# Patient Record
Sex: FEMALE | Race: WHITE | NOT HISPANIC OR LATINO | Employment: UNEMPLOYED | URBAN - NONMETROPOLITAN AREA
[De-identification: names, ages, dates, MRNs, and addresses within clinical notes are randomized per-mention and may not be internally consistent; named-entity substitution may affect disease eponyms.]

---

## 2021-01-19 PROCEDURE — U0004 COV-19 TEST NON-CDC HGH THRU: HCPCS | Performed by: PHYSICIAN ASSISTANT

## 2021-01-22 ENCOUNTER — INITIAL PRENATAL (OUTPATIENT)
Dept: OBSTETRICS AND GYNECOLOGY | Facility: CLINIC | Age: 21
End: 2021-01-22

## 2021-01-22 VITALS — WEIGHT: 168 LBS | DIASTOLIC BLOOD PRESSURE: 72 MMHG | SYSTOLIC BLOOD PRESSURE: 122 MMHG | BODY MASS INDEX: 26.71 KG/M2

## 2021-01-22 DIAGNOSIS — Z34.92 NORMAL PREGNANCY, SECOND TRIMESTER: ICD-10-CM

## 2021-01-22 DIAGNOSIS — Z3A.22 22 WEEKS GESTATION OF PREGNANCY: ICD-10-CM

## 2021-01-22 DIAGNOSIS — Z36.89 SCREENING, ANTENATAL, FOR FETAL ANATOMIC SURVEY: Primary | ICD-10-CM

## 2021-01-22 PROCEDURE — 99204 OFFICE O/P NEW MOD 45 MIN: CPT | Performed by: NURSE PRACTITIONER

## 2021-01-22 RX ORDER — ONDANSETRON 4 MG/1
4 TABLET, FILM COATED ORAL EVERY 8 HOURS PRN
COMMUNITY

## 2021-01-22 RX ORDER — CALCIUM CARBONATE 300MG(750)
1 TABLET,CHEWABLE ORAL DAILY
Qty: 30 TABLET | Refills: 4 | Status: SHIPPED | OUTPATIENT
Start: 2021-01-22

## 2021-01-22 NOTE — PATIENT INSTRUCTIONS
Second Trimester of Pregnancy  The second trimester is from week 13 through week 28, months 4 through 6. The second trimester is often a time when you feel your best. Your body has also adjusted to being pregnant, and you begin to feel better physically. Usually, morning sickness has lessened or quit completely, you may have more energy, and you may have an increase in appetite. The second trimester is also a time when the fetus is growing rapidly. At the end of the sixth month, the fetus is about 9 inches long and weighs about 1½ pounds. You will likely begin to feel the baby move (quickening) between 18 and 20 weeks of the pregnancy.  BODY CHANGES  Your body goes through many changes during pregnancy. The changes vary from woman to woman.   · Your weight will continue to increase. You will notice your lower abdomen bulging out.  · You may begin to get stretch marks on your hips, abdomen, and breasts.  · You may develop headaches that can be relieved by medicines approved by your health care provider.  · You may urinate more often because the fetus is pressing on your bladder.  · You may develop or continue to have heartburn as a result of your pregnancy.  · You may develop constipation because certain hormones are causing the muscles that push waste through your intestines to slow down.  · You may develop hemorrhoids or swollen, bulging veins (varicose veins).  · You may have back pain because of the weight gain and pregnancy hormones relaxing your joints between the bones in your pelvis and as a result of a shift in weight and the muscles that support your balance.  · Your breasts will continue to grow and be tender.  · Your gums may bleed and may be sensitive to brushing and flossing.  · Dark spots or blotches (chloasma, mask of pregnancy) may develop on your face. This will likely fade after the baby is born.  · A dark line from your belly button to the pubic area (linea nigra) may appear. This will likely fade  after the baby is born.  · You may have changes in your hair. These can include thickening of your hair, rapid growth, and changes in texture. Some women also have hair loss during or after pregnancy, or hair that feels dry or thin. Your hair will most likely return to normal after your baby is born.  WHAT TO EXPECT AT YOUR PRENATAL VISITS  During a routine prenatal visit:  · You will be weighed to make sure you and the fetus are growing normally.  · Your blood pressure will be taken.  · Your abdomen will be measured to track your baby's growth.  · The fetal heartbeat will be listened to.  · Any test results from the previous visit will be discussed.  Your health care provider may ask you:  · How you are feeling.  · If you are feeling the baby move.  · If you have had any abnormal symptoms, such as leaking fluid, bleeding, severe headaches, or abdominal cramping.  · If you are using any tobacco products, including cigarettes, chewing tobacco, and electronic cigarettes.  · If you have any questions.  Other tests that may be performed during your second trimester include:  · Blood tests that check for:  ¨ Low iron levels (anemia).  ¨ Gestational diabetes (between 24 and 28 weeks).  ¨ Rh antibodies.  · Urine tests to check for infections, diabetes, or protein in the urine.  · An ultrasound to confirm the proper growth and development of the baby.  · An amniocentesis to check for possible genetic problems.  · Fetal screens for spina bifida and Down syndrome.  · HIV (human immunodeficiency virus) testing. Routine prenatal testing includes screening for HIV, unless you choose not to have this test.  HOME CARE INSTRUCTIONS   · Avoid all smoking, herbs, alcohol, and unprescribed drugs. These chemicals affect the formation and growth of the baby.  · Do not use any tobacco products, including cigarettes, chewing tobacco, and electronic cigarettes. If you need help quitting, ask your health care provider. You may receive  counseling support and other resources to help you quit.  · Follow your health care provider's instructions regarding medicine use. There are medicines that are either safe or unsafe to take during pregnancy.  · Exercise only as directed by your health care provider. Experiencing uterine cramps is a good sign to stop exercising.  · Continue to eat regular, healthy meals.  · Wear a good support bra for breast tenderness.  · Do not use hot tubs, steam rooms, or saunas.  · Wear your seat belt at all times when driving.  · Avoid raw meat, uncooked cheese, cat litter boxes, and soil used by cats. These carry germs that can cause birth defects in the baby.  · Take your prenatal vitamins.  · Take 9203-6435 mg of calcium daily starting at the 20th week of pregnancy until you deliver your baby.  · Try taking a stool softener (if your health care provider approves) if you develop constipation. Eat more high-fiber foods, such as fresh vegetables or fruit and whole grains. Drink plenty of fluids to keep your urine clear or pale yellow.  · Take warm sitz baths to soothe any pain or discomfort caused by hemorrhoids. Use hemorrhoid cream if your health care provider approves.  · If you develop varicose veins, wear support hose. Elevate your feet for 15 minutes, 3-4 times a day. Limit salt in your diet.  · Avoid heavy lifting, wear low heel shoes, and practice good posture.  · Rest with your legs elevated if you have leg cramps or low back pain.  · Visit your dentist if you have not gone yet during your pregnancy. Use a soft toothbrush to brush your teeth and be gentle when you floss.  · A sexual relationship may be continued unless your health care provider directs you otherwise.  · Continue to go to all your prenatal visits as directed by your health care provider.  SEEK MEDICAL CARE IF:   · You have dizziness.  · You have mild pelvic cramps, pelvic pressure, or nagging pain in the abdominal area.  · You have persistent nausea,  vomiting, or diarrhea.  · You have a bad smelling vaginal discharge.  · You have pain with urination.  SEEK IMMEDIATE MEDICAL CARE IF:   · You have a fever.  · You are leaking fluid from your vagina.  · You have spotting or bleeding from your vagina.  · You have severe abdominal cramping or pain.  · You have rapid weight gain or loss.  · You have shortness of breath with chest pain.  · You notice sudden or extreme swelling of your face, hands, ankles, feet, or legs.  · You have not felt your baby move in over an hour.  · You have severe headaches that do not go away with medicine.  · You have vision changes.     This information is not intended to replace advice given to you by your health care provider. Make sure you discuss any questions you have with your health care provider.     Stable

## 2021-01-22 NOTE — PROGRESS NOTES
Chief Complaint   Patient presents with   • Initial Prenatal Visit     NOB, LMP 2020, Last pap  WNL, BRENNEN 2021 per: [facility], Neli English. Does not have all her records with her today. Patient states she is doing well      22 4/7 wks     HPI  , EDC 2021 presents to our office today for initial prenatal visit.    She has received PNC in [place] - she reports a total of 3 visits.    Came to [place] with FOB   She is not working at present   She did smoke cigarettes but has stopped with pregnancy - she denies alcohol or recreational drug use     EDC from initial u/s at ?? 11 wks   No labs -   Reports she had pap smear with initial OB visit in [place]  She reports she was not given all her records     Chart Review:  1 visit at 11 wks - - noted IUP with partial previa   No labs       Past Medical History:   Diagnosis Date   • Asthma    • Asthma         Current Outpatient Medications:   •  ondansetron (ZOFRAN) 4 MG tablet, Take 4 mg by mouth Every 8 (Eight) Hours As Needed for Nausea or Vomiting., Disp: , Rfl:   •  prenatal vitamin (prenatal, CLASSIC, vitamin) tablet, Take  by mouth Daily., Disp: , Rfl:    No Known Allergies   History reviewed. No pertinent surgical history.    Social History     Socioeconomic History   • Marital status: Single     Spouse name: Not on file   • Number of children: Not on file   • Years of education: Not on file   • Highest education level: Not on file   Social Needs   • Financial resource strain: Not hard at all   • Food insecurity     Worry: Never true     Inability: Never true   • Transportation needs     Medical: Yes     Non-medical: Yes   Tobacco Use   • Smoking status: Former Smoker   • Smokeless tobacco: Never Used   Substance and Sexual Activity   • Alcohol use: Never     Frequency: Never   • Drug use: Not Currently   • Sexual activity: Yes     Partners: Male   Lifestyle   • Physical activity     Days per week: Patient refused     Minutes  per session: Patient refused   • Stress: Only a little      Family History   Problem Relation Age of Onset   • Diabetes Maternal Grandmother    • Colon cancer Maternal Grandfather    • Lung cancer Maternal Grandfather    • Heart disease Maternal Grandfather    • Stroke Maternal Grandfather    • Stroke Paternal Grandfather        The following portions of the patient's history were reviewed as note above and updated   current medications allergies, past family history, past medical history, past social history and past surgical history.      ROS  Pertinent items are noted in HPI, all other systems reviewed and negative      Physical Exam  /72   Wt 76.2 kg (168 lb)   BMI 26.71 kg/m²        Psych: Alert and oriented to time, place and person  Mood and affect appropriate   General: well developed; well nourished  no acute distress  Head: normocephalic  Neck: The neck is supple and the trachea is midline  Musculoskeletal: Normal gait  Full range of motion  Lungs:  breathing is unlabored  Back: Negative CVAT  Abdomen: Gravid - soft and non-tender + FHTs   Lower Extremities: Neg edema and moves extremities well  Genitourinary: deferred      MDM  Impression:  Problems/Risks: Normal Pregnancy  transfer of care   Limited PN Care    Tests done today: Routine NOB labs / U/A Culture - needs to be drawn next wk   U/S rev'd - 27 % growth  Boy     Topics discussed: Rout NOB education including nutrition, exercise, OTCmeds, genetic screening   flu vac  Written info on 2nd trimester of pregnancy   Request records from previous practioner  encouraged questions - call prn    Tests next visit: 1 hr glucola

## 2021-01-25 PROCEDURE — 90471 IMMUNIZATION ADMIN: CPT | Performed by: NURSE PRACTITIONER

## 2021-01-25 PROCEDURE — 90686 IIV4 VACC NO PRSV 0.5 ML IM: CPT | Performed by: NURSE PRACTITIONER

## 2021-02-22 ENCOUNTER — ROUTINE PRENATAL (OUTPATIENT)
Dept: OBSTETRICS AND GYNECOLOGY | Facility: CLINIC | Age: 21
End: 2021-02-22

## 2021-02-22 VITALS — SYSTOLIC BLOOD PRESSURE: 112 MMHG | WEIGHT: 172.2 LBS | BODY MASS INDEX: 27.38 KG/M2 | DIASTOLIC BLOOD PRESSURE: 64 MMHG

## 2021-02-22 DIAGNOSIS — Z3A.26 26 WEEKS GESTATION OF PREGNANCY: Primary | ICD-10-CM

## 2021-02-22 PROCEDURE — 99212 OFFICE O/P EST SF 10 MIN: CPT | Performed by: OBSTETRICS & GYNECOLOGY

## 2021-02-22 RX ORDER — LORATADINE 10 MG/1
10 TABLET ORAL DAILY
COMMUNITY
Start: 2021-02-20

## 2021-02-22 RX ORDER — FLUTICASONE PROPIONATE 50 MCG
2 SPRAY, SUSPENSION (ML) NASAL DAILY
COMMUNITY
Start: 2021-02-20

## 2021-02-22 NOTE — PROGRESS NOTES
Chief Complaint   Patient presents with   • Routine Prenatal Visit     Patient is here for routing prenatal visit and glucola screen.        HPI:   , 26w6d gestation reports doing well    ROS:  See Prenatal Episode/Flowsheet  /64   Wt 78.1 kg (172 lb 3.2 oz)   BMI 27.38 kg/m²      EXAM:  EXTREMITIES:  No swelling-See Prenatal Episode/Flowsheet    ABDOMEN:  FHTs/Movement noted-See Prenatal Episode/Flowsheet    URINE GLUCOSE/PROTEIN:  See Prenatal Episode/Flowsheet    PELVIC EXAM:  See Prenatal Episode/Flowsheet  CV:  Lungs:  GYN:    MDM:    No results found for: HGB, RUBELLAIGGIN, RUBELLAABIGG, HEPBSAG, LABRPR, ABORH, ABO, RH, ABSCRN, LABANTI, ABID, [id number], [id number], HEPCVIRUSABY, MDC9IUZH, STREPGPB, URINECX    U/S:    1. IUP 26w6d  2. Routine care   3. Gluocla today with PNL's

## 2021-02-23 LAB
BASOPHILS # BLD AUTO: 0.04 10*3/MM3 (ref 0–0.2)
BASOPHILS NFR BLD AUTO: 0.4 % (ref 0–1.5)
EOSINOPHIL # BLD AUTO: 0.04 10*3/MM3 (ref 0–0.4)
EOSINOPHIL NFR BLD AUTO: 0.4 % (ref 0.3–6.2)
ERYTHROCYTE [DISTWIDTH] IN BLOOD BY AUTOMATED COUNT: 12.5 % (ref 12.3–15.4)
GLUCOSE 1H P 50 G GLC PO SERPL-MCNC: 106 MG/DL (ref 65–139)
HCT VFR BLD AUTO: 34.3 % (ref 34–46.6)
HGB BLD-MCNC: 11.2 G/DL (ref 12–15.9)
IMM GRANULOCYTES # BLD AUTO: 0.08 10*3/MM3 (ref 0–0.05)
IMM GRANULOCYTES NFR BLD AUTO: 0.9 % (ref 0–0.5)
LYMPHOCYTES # BLD AUTO: 1.83 10*3/MM3 (ref 0.7–3.1)
LYMPHOCYTES NFR BLD AUTO: 19.4 % (ref 19.6–45.3)
MCH RBC QN AUTO: 32.3 PG (ref 26.6–33)
MCHC RBC AUTO-ENTMCNC: 32.7 G/DL (ref 31.5–35.7)
MCV RBC AUTO: 98.8 FL (ref 79–97)
MONOCYTES # BLD AUTO: 0.72 10*3/MM3 (ref 0.1–0.9)
MONOCYTES NFR BLD AUTO: 7.7 % (ref 5–12)
NEUTROPHILS # BLD AUTO: 6.7 10*3/MM3 (ref 1.7–7)
NEUTROPHILS NFR BLD AUTO: 71.2 % (ref 42.7–76)
NRBC BLD AUTO-RTO: 0 /100 WBC (ref 0–0.2)
PLATELET # BLD AUTO: 283 10*3/MM3 (ref 140–450)
RBC # BLD AUTO: 3.47 10*6/MM3 (ref 3.77–5.28)
WBC # BLD AUTO: 9.41 10*3/MM3 (ref 3.4–10.8)

## 2021-03-30 ENCOUNTER — ROUTINE PRENATAL (OUTPATIENT)
Dept: OBSTETRICS AND GYNECOLOGY | Facility: CLINIC | Age: 21
End: 2021-03-30

## 2021-03-30 VITALS — DIASTOLIC BLOOD PRESSURE: 74 MMHG | SYSTOLIC BLOOD PRESSURE: 118 MMHG | BODY MASS INDEX: 28.3 KG/M2 | WEIGHT: 178 LBS

## 2021-03-30 DIAGNOSIS — Z3A.32 32 WEEKS GESTATION OF PREGNANCY: ICD-10-CM

## 2021-03-30 DIAGNOSIS — Z36.89 ENCOUNTER FOR ULTRASOUND TO CHECK FETAL GROWTH: Primary | ICD-10-CM

## 2021-03-30 PROCEDURE — 99213 OFFICE O/P EST LOW 20 MIN: CPT | Performed by: OBSTETRICS & GYNECOLOGY

## 2021-03-30 NOTE — PROGRESS NOTES
Chief Complaint   Patient presents with   • Routine Prenatal Visit     Growth SCan, No Complaints/concerns , Good fetal movement        HPI:   , 32w0d gestation reports doing well    ROS:  See Prenatal Episode/Flowsheet  /74   Wt 80.7 kg (178 lb)   BMI 28.30 kg/m²      EXAM:  EXTREMITIES:  No swelling-See Prenatal Episode/Flowsheet    ABDOMEN:  FHTs/Movement noted-See Prenatal Episode/Flowsheet    URINE GLUCOSE/PROTEIN:  See Prenatal Episode/Flowsheet    PELVIC EXAM:  See Prenatal Episode/Flowsheet  CV:  Lungs:  GYN:    MDM:    Lab Results   Component Value Date    HGB 11.2 (L) 2021       U/S: Overall growth 6.1 percentile.  MARY ELLEN 13.51.  Systolic to diastolic ratio 3.35.  Vertex.  Anterior placenta.  Active fetus    1. IUP 32w0d  2. Routine care   3.  IUGR: Twice-weekly testing.  Prenatal labs needed today  Records reviewed again from [place] prenatal care.  She is to ultrasound the first trimester that were consistent though about a week ahead of her LMP derived due date.  Her LMP derived due date was 21 while the ultrasounds were 5-26.  Her 22-week ultrasound here was consistent with this 526 due date.

## 2021-03-31 LAB
ABO GROUP BLD: ABNORMAL
BASOPHILS # BLD AUTO: 0 X10E3/UL (ref 0–0.2)
BASOPHILS NFR BLD AUTO: 0 %
BLD GP AB SCN SERPL QL: NEGATIVE
EOSINOPHIL # BLD AUTO: 0.1 X10E3/UL (ref 0–0.4)
EOSINOPHIL NFR BLD AUTO: 1 %
ERYTHROCYTE [DISTWIDTH] IN BLOOD BY AUTOMATED COUNT: 12.3 % (ref 11.7–15.4)
HBV SURFACE AG SERPL QL IA: NEGATIVE
HCT VFR BLD AUTO: 34.5 % (ref 34–46.6)
HCV AB S/CO SERPL IA: <0.1 S/CO RATIO (ref 0–0.9)
HGB BLD-MCNC: 11.1 G/DL (ref 11.1–15.9)
HIV 1+2 AB+HIV1 P24 AG SERPL QL IA: NON REACTIVE
IMM GRANULOCYTES # BLD AUTO: 0 X10E3/UL (ref 0–0.1)
IMM GRANULOCYTES NFR BLD AUTO: 0 %
LYMPHOCYTES # BLD AUTO: 2.1 X10E3/UL (ref 0.7–3.1)
LYMPHOCYTES NFR BLD AUTO: 22 %
MCH RBC QN AUTO: 30.4 PG (ref 26.6–33)
MCHC RBC AUTO-ENTMCNC: 32.2 G/DL (ref 31.5–35.7)
MCV RBC AUTO: 95 FL (ref 79–97)
MONOCYTES # BLD AUTO: 0.6 X10E3/UL (ref 0.1–0.9)
MONOCYTES NFR BLD AUTO: 7 %
NEUTROPHILS # BLD AUTO: 6.6 X10E3/UL (ref 1.4–7)
NEUTROPHILS NFR BLD AUTO: 70 %
PLATELET # BLD AUTO: 272 X10E3/UL (ref 150–450)
RBC # BLD AUTO: 3.65 X10E6/UL (ref 3.77–5.28)
RH BLD: POSITIVE
RPR SER QL: NON REACTIVE
RUBV IGG SERPL IA-ACNC: 1.04 INDEX
WBC # BLD AUTO: 9.5 X10E3/UL (ref 3.4–10.8)

## 2021-04-03 ENCOUNTER — HOSPITAL ENCOUNTER (OUTPATIENT)
Facility: HOSPITAL | Age: 21
Discharge: HOME | End: 2021-04-03
Attending: NURSE PRACTITIONER | Admitting: NURSE PRACTITIONER

## 2021-04-03 VITALS
SYSTOLIC BLOOD PRESSURE: 106 MMHG | OXYGEN SATURATION: 100 % | DIASTOLIC BLOOD PRESSURE: 60 MMHG | RESPIRATION RATE: 14 BRPM | BODY MASS INDEX: 27.97 KG/M2 | TEMPERATURE: 97.8 F | HEIGHT: 66 IN | HEART RATE: 85 BPM | WEIGHT: 174 LBS

## 2021-04-03 LAB
BILIRUB BLD-MCNC: NEGATIVE MG/DL
CLARITY, POC: CLEAR
COLOR UR: YELLOW
GLUCOSE UR STRIP-MCNC: NEGATIVE MG/DL
KETONES UR QL: NEGATIVE
LEUKOCYTE EST, POC: NEGATIVE
NITRITE UR-MCNC: NEGATIVE MG/ML
PH UR: 7 [PH] (ref 5–8)
PROT UR STRIP-MCNC: NEGATIVE MG/DL
RBC # UR STRIP: NEGATIVE /UL
SP GR UR: 1.01 (ref 1–1.03)
UROBILINOGEN UR QL: NORMAL

## 2021-04-03 PROCEDURE — 81002 URINALYSIS NONAUTO W/O SCOPE: CPT | Performed by: NURSE PRACTITIONER

## 2021-04-03 PROCEDURE — G0463 HOSPITAL OUTPT CLINIC VISIT: HCPCS

## 2021-04-03 PROCEDURE — 59025 FETAL NON-STRESS TEST: CPT

## 2021-04-03 PROCEDURE — 59025 FETAL NON-STRESS TEST: CPT | Performed by: NURSE PRACTITIONER

## 2021-04-03 NOTE — NON STRESS TEST
Triage Note - Nursing Documentation  Labor and Delivery Admission Log    Sharonda Saez  : 2000  MRN: [id number]  CSN: [id number]    Date in / Time in:  4/3/2021  Time in: 836    Date out / Time out:    Time out: 935    Nurse: Tracy Kern RN    Patient Info: She is a 20 y.o. year old  at 32w4d with an BRENNEN of 2021, by Ultrasound who was seen on the [facility].    Chief Complaint:   Chief Complaint   Patient presents with   • Non-stress Test     SMALL FOR DATES       Provider Instructions / Disposition: Reactive NST, Active Fetus, VSS, PO Hydrated, discharged home    There is no problem list on file for this patient.      NST Documentation (Only applicable > 32 weeks): Interpretation A  Nonstress Test Interpretation A: Reactive (21 0928 : Tracy Kern RN)

## 2021-04-03 NOTE — NURSING NOTE
0926- called and informed COURTNEY Mei pt arrived to  for scheduled NST for IUGR, pt reports no pain, reactive NST, active fetus, some uterine irritability but resolving with po hydration, urine dip clear, active fetus. APRN verbalized ok to discharge home

## 2021-04-06 ENCOUNTER — ROUTINE PRENATAL (OUTPATIENT)
Dept: OBSTETRICS AND GYNECOLOGY | Facility: CLINIC | Age: 21
End: 2021-04-06

## 2021-04-06 VITALS — DIASTOLIC BLOOD PRESSURE: 70 MMHG | SYSTOLIC BLOOD PRESSURE: 112 MMHG | BODY MASS INDEX: 28.73 KG/M2 | WEIGHT: 178 LBS

## 2021-04-06 DIAGNOSIS — O36.5931 POOR FETAL GROWTH AFFECTING MANAGEMENT OF MOTHER IN THIRD TRIMESTER, FETUS 1 OF MULTIPLE GESTATION: Primary | ICD-10-CM

## 2021-04-06 PROCEDURE — 99213 OFFICE O/P EST LOW 20 MIN: CPT | Performed by: OBSTETRICS & GYNECOLOGY

## 2021-04-06 NOTE — PROGRESS NOTES
Chief Complaint   Patient presents with   • Routine Prenatal Visit     scan done for IUGR         HPI:   , 33w0d gestation reports doing well    ROS:  See Prenatal Episode/Flowsheet  /70   Wt 80.7 kg (178 lb)   BMI 28.73 kg/m²      EXAM:  EXTREMITIES:  No swelling-See Prenatal Episode/Flowsheet    ABDOMEN:  FHTs/Movement noted-See Prenatal Episode/Flowsheet    URINE GLUCOSE/PROTEIN:  See Prenatal Episode/Flowsheet    PELVIC EXAM:  See Prenatal Episode/Flowsheet  CV:  Lungs:  GYN:    MDM:    Lab Results   Component Value Date    HGB 11.1 2021    RUBELLAABIGG 1.04 2021    HEPBSAG Negative 2021    ABO A 2021    RH Positive 2021    ABSCRN Negative 2021    [id number] Non Reactive 2021    HEPCVIRUSABY <0.1 2021       U/S: Biophysical profile is 8 out of 8.  MARY ELLEN is 15.82.  Systolic to diastolic ratio 2.53-normal.  Vertex.  Anterior placenta.  Active fetus    1. IUP 33w0d  2. Routine care   3.  IUGR: Reassuring testing.  NST later in the week BPP 1 week.  Repeat growth 35 weeks

## 2021-04-10 ENCOUNTER — HOSPITAL ENCOUNTER (OUTPATIENT)
Dept: LABOR AND DELIVERY | Facility: HOSPITAL | Age: 21
Discharge: HOME | End: 2021-04-10

## 2021-04-10 ENCOUNTER — HOSPITAL ENCOUNTER (OUTPATIENT)
Facility: HOSPITAL | Age: 21
Discharge: HOME | End: 2021-04-10
Attending: MIDWIFE | Admitting: MIDWIFE

## 2021-04-10 VITALS
HEIGHT: 66 IN | SYSTOLIC BLOOD PRESSURE: 122 MMHG | DIASTOLIC BLOOD PRESSURE: 63 MMHG | BODY MASS INDEX: 28.61 KG/M2 | OXYGEN SATURATION: 100 % | TEMPERATURE: 98.4 F | WEIGHT: 178 LBS | RESPIRATION RATE: 18 BRPM | HEART RATE: 87 BPM

## 2021-04-10 PROCEDURE — G0463 HOSPITAL OUTPT CLINIC VISIT: HCPCS

## 2021-04-10 PROCEDURE — 81002 URINALYSIS NONAUTO W/O SCOPE: CPT | Performed by: MIDWIFE

## 2021-04-10 PROCEDURE — 59025 FETAL NON-STRESS TEST: CPT | Performed by: MIDWIFE

## 2021-04-10 PROCEDURE — 59025 FETAL NON-STRESS TEST: CPT

## 2021-04-13 ENCOUNTER — ROUTINE PRENATAL (OUTPATIENT)
Dept: OBSTETRICS AND GYNECOLOGY | Facility: CLINIC | Age: 21
End: 2021-04-13

## 2021-04-13 VITALS — WEIGHT: 180 LBS | SYSTOLIC BLOOD PRESSURE: 112 MMHG | DIASTOLIC BLOOD PRESSURE: 74 MMHG | BODY MASS INDEX: 29.05 KG/M2

## 2021-04-13 DIAGNOSIS — Z34.93 PRENATAL CARE IN THIRD TRIMESTER: Primary | ICD-10-CM

## 2021-04-13 DIAGNOSIS — O36.5930 POOR FETAL GROWTH AFFECTING MANAGEMENT OF MOTHER IN THIRD TRIMESTER, SINGLE OR UNSPECIFIED FETUS: ICD-10-CM

## 2021-04-13 PROCEDURE — 99213 OFFICE O/P EST LOW 20 MIN: CPT | Performed by: OBSTETRICS & GYNECOLOGY

## 2021-04-13 NOTE — PROGRESS NOTES
Prenatal Care Visit    Subjective   Chief Complaint   Patient presents with   • Routine Prenatal Visit     BPP done today, no complaints.       History:   Sharonda is a  currently at 34w0d who presents for a prenatal care visit today.    No issues.    Social History    Tobacco Use      Smoking status: Former Smoker        Packs/day: 0.25        Years: 2.00        Pack years: .5        Types: Cigarettes        Quit date: 2020        Years since quittin.5      Smokeless tobacco: Never Used       Objective   /74   Wt 81.6 kg (180 lb)   BMI 29.05 kg/m²   Physical Exam:  Normal, gestational age-appropriate exam today        Plan   Medical Decision Making:    I have reviewed the prenatal labs and ultrasound(s) today. I have reviewed the most recent prenatal progress note(s).    Diagnosis: Supervision of high risk pregnancy   IUGR, uncomplicated  Transfer of care  Limited prenatal care   Tests/Orders/Rx today: Orders Placed This Encounter   Procedures   • US Color Flow Doppler Umbilical Artery     Order Specific Question:   Reason for Exam:     Answer:   IUGR   • US Fetal Biophysical Profile;Without Non-Stress Testing     Order Specific Question:   Reason for Exam:     Answer:   IUGR       Medication Management: none     Topics discussed: Prenatal care milestones  U/S findings   Twice weekly ANT   Tests next visit: BPP  NST   Next visit: 1 week(s)     Norm Morin MD  Obstetrics and Gynecology  [facility]

## 2021-04-17 ENCOUNTER — HOSPITAL ENCOUNTER (OUTPATIENT)
Facility: HOSPITAL | Age: 21
Discharge: HOME | End: 2021-04-17
Attending: OBSTETRICS & GYNECOLOGY | Admitting: OBSTETRICS & GYNECOLOGY

## 2021-04-17 ENCOUNTER — HOSPITAL ENCOUNTER (OUTPATIENT)
Dept: LABOR AND DELIVERY | Facility: HOSPITAL | Age: 21
Discharge: HOME | End: 2021-04-17

## 2021-04-17 VITALS
SYSTOLIC BLOOD PRESSURE: 109 MMHG | DIASTOLIC BLOOD PRESSURE: 65 MMHG | TEMPERATURE: 99.2 F | OXYGEN SATURATION: 100 % | RESPIRATION RATE: 16 BRPM | WEIGHT: 182.1 LBS | BODY MASS INDEX: 29.27 KG/M2 | HEIGHT: 66 IN | HEART RATE: 106 BPM

## 2021-04-17 LAB
BILIRUB BLD-MCNC: NEGATIVE MG/DL
CLARITY, POC: CLEAR
COLOR UR: YELLOW
GLUCOSE UR STRIP-MCNC: NEGATIVE MG/DL
KETONES UR QL: NEGATIVE
LEUKOCYTE EST, POC: NEGATIVE
NITRITE UR-MCNC: NEGATIVE MG/ML
PH UR: 7.5 [PH] (ref 5–8)
PROT UR STRIP-MCNC: NEGATIVE MG/DL
RBC # UR STRIP: NEGATIVE /UL
SP GR UR: 1.01 (ref 1–1.03)
UROBILINOGEN UR QL: NORMAL

## 2021-04-17 PROCEDURE — G0463 HOSPITAL OUTPT CLINIC VISIT: HCPCS

## 2021-04-17 PROCEDURE — 59025 FETAL NON-STRESS TEST: CPT

## 2021-04-17 PROCEDURE — 81002 URINALYSIS NONAUTO W/O SCOPE: CPT | Performed by: OBSTETRICS & GYNECOLOGY

## 2021-04-17 PROCEDURE — 59025 FETAL NON-STRESS TEST: CPT | Performed by: OBSTETRICS & GYNECOLOGY

## 2021-04-17 RX ORDER — FERROUS SULFATE 325(65) MG
325 TABLET ORAL
COMMUNITY
End: 2021-05-07 | Stop reason: HOSPADM

## 2021-04-17 NOTE — NON STRESS TEST
Non Stress Test    [facility]    Patient: Sharonda Saez  : 2000  MRN: [id number]  CSN: [id number]    Gestational Age: 34w4d    Indication for NST intrauterine growth retardation       Time On 10:12   Time Off 10:43       Interpretation    Baseline 's beats per minute   Category 1   Decelerations Absent       Additional Comments See nursing notes       Recommendations for f/u See nursing notes       This note has been electronically signed.    Anny Hoff M.D.

## 2021-04-17 NOTE — NON STRESS TEST
Triage Note - Nursing Documentation  Labor and Delivery Admission Log    Sharonda Saez  : 2000  MRN: [id number]  CSN: [id number]    Date in / Time in:  2021  Time in: 1010    Date out / Time out:    Time out: 1051    Nurse: Sultana Saunders, RN    Patient Info: She is a 20 y.o. year old  at 34w4d with an BRENNEN of 2021, by Ultrasound who was seen on the [facility].    Chief Complaint:   Chief Complaint   Patient presents with   • Non-stress Test     Scheduled for IUGR       Provider Instructions / Disposition: Discharged home after reactive NST.  Follows up on Tuesday in office.     Patient Active Problem List   Diagnosis   • Poor fetal growth affecting management of mother in third trimester       NST Documentation (Only applicable > 32 weeks): Interpretation A  Nonstress Test Interpretation A: Reactive (21 1030 : Sultana Saunders, RN)

## 2021-04-17 NOTE — NURSING NOTE
Call to  to inform of patients arrival to  for scheduled NST for IUGR.  NST reactive, vitals WDL, and urine dip WDL. Patient has no complaints at this time, and has follow up in office on Tuesday scheduled. Orders received that patient may be discharged home.

## 2021-04-20 ENCOUNTER — ROUTINE PRENATAL (OUTPATIENT)
Dept: OBSTETRICS AND GYNECOLOGY | Facility: CLINIC | Age: 21
End: 2021-04-20

## 2021-04-20 VITALS — WEIGHT: 183 LBS | SYSTOLIC BLOOD PRESSURE: 120 MMHG | BODY MASS INDEX: 29.54 KG/M2 | DIASTOLIC BLOOD PRESSURE: 72 MMHG

## 2021-04-20 DIAGNOSIS — O36.5930 POOR FETAL GROWTH AFFECTING MANAGEMENT OF MOTHER IN THIRD TRIMESTER, SINGLE OR UNSPECIFIED FETUS: ICD-10-CM

## 2021-04-20 DIAGNOSIS — Z34.93 PRENATAL CARE IN THIRD TRIMESTER: Primary | ICD-10-CM

## 2021-04-20 PROCEDURE — 99213 OFFICE O/P EST LOW 20 MIN: CPT | Performed by: OBSTETRICS & GYNECOLOGY

## 2021-04-20 RX ORDER — ALBUTEROL SULFATE 90 UG/1
2 AEROSOL, METERED RESPIRATORY (INHALATION) EVERY 6 HOURS PRN
Qty: 8 G | Refills: 5 | Status: SHIPPED | OUTPATIENT
Start: 2021-04-20

## 2021-04-20 RX ORDER — EPINEPHRINE 0.3 MG/.3ML
0.3 INJECTION SUBCUTANEOUS ONCE AS NEEDED
Qty: 1 EACH | Refills: 3 | Status: SHIPPED | OUTPATIENT
Start: 2021-04-20

## 2021-04-20 NOTE — PROGRESS NOTES
Prenatal Care Visit    Subjective   Chief Complaint   Patient presents with   • Routine Prenatal Visit     BPP done today, no complaints       History:   Sharonda is a  currently at 35w0d who presents for a prenatal care visit today.    No issues.    Social History    Tobacco Use      Smoking status: Former Smoker        Packs/day: 0.25        Years: 2.00        Pack years: .5        Types: Cigarettes        Quit date: 2020        Years since quittin.5      Smokeless tobacco: Never Used       Objective   /72   Wt 83 kg (183 lb)   BMI 29.54 kg/m²   Physical Exam:  Normal, gestational age-appropriate exam today        Plan   Medical Decision Making:    I have reviewed the prenatal labs and ultrasound(s) today. I have reviewed the most recent prenatal progress note(s).    Diagnosis: Supervision of high risk pregnancy   IUGR, uncomplicated  Transfer of care  Limited prenatal care   Tests/Orders/Rx today: Orders Placed This Encounter   Procedures   • US Fetal Biophysical Profile;Without Non-Stress Testing     Order Specific Question:   Reason for Exam:     Answer:   IUGR   • US Color Flow Doppler Umbilical Artery     Order Specific Question:   Reason for Exam:     Answer:   IUGR       Medication Management: none     Topics discussed: Prenatal care milestones  kick counts and fetal movement  PIH precautions   labor signs and symptoms  U/S findings   Twice weekly ANT   Tests next visit: BPP  NST   Next visit: 1 week(s)     Norm Morin MD  Obstetrics and Gynecology  [facility]

## 2021-04-24 ENCOUNTER — HOSPITAL ENCOUNTER (OUTPATIENT)
Facility: HOSPITAL | Age: 21
Discharge: HOME | End: 2021-04-24
Attending: OBSTETRICS & GYNECOLOGY | Admitting: OBSTETRICS & GYNECOLOGY

## 2021-04-24 ENCOUNTER — HOSPITAL ENCOUNTER (OUTPATIENT)
Dept: LABOR AND DELIVERY | Facility: HOSPITAL | Age: 21
Discharge: HOME | End: 2021-04-24

## 2021-04-24 VITALS
DIASTOLIC BLOOD PRESSURE: 65 MMHG | OXYGEN SATURATION: 100 % | HEIGHT: 66 IN | SYSTOLIC BLOOD PRESSURE: 121 MMHG | BODY MASS INDEX: 29.62 KG/M2 | TEMPERATURE: 97.9 F | WEIGHT: 184.3 LBS | HEART RATE: 81 BPM | RESPIRATION RATE: 16 BRPM

## 2021-04-24 LAB
BILIRUB BLD-MCNC: NEGATIVE MG/DL
CLARITY, POC: CLEAR
COLOR UR: YELLOW
GLUCOSE UR STRIP-MCNC: NEGATIVE MG/DL
KETONES UR QL: NEGATIVE
LEUKOCYTE EST, POC: NEGATIVE
NITRITE UR-MCNC: NEGATIVE MG/ML
PH UR: 6.5 [PH] (ref 5–8)
PROT UR STRIP-MCNC: NEGATIVE MG/DL
RBC # UR STRIP: NEGATIVE /UL
SP GR UR: 1.01 (ref 1–1.03)
UROBILINOGEN UR QL: NORMAL

## 2021-04-24 PROCEDURE — 59025 FETAL NON-STRESS TEST: CPT | Performed by: OBSTETRICS & GYNECOLOGY

## 2021-04-24 PROCEDURE — 59025 FETAL NON-STRESS TEST: CPT

## 2021-04-24 PROCEDURE — G0463 HOSPITAL OUTPT CLINIC VISIT: HCPCS

## 2021-04-24 PROCEDURE — 81002 URINALYSIS NONAUTO W/O SCOPE: CPT | Performed by: OBSTETRICS & GYNECOLOGY

## 2021-04-24 NOTE — DISCHARGE INSTRUCTIONS
Return to [facility] with any problems or concerns.    Drink at least 8-10 glasses of water per day.     Keep all scheduled OB appointments.

## 2021-04-24 NOTE — NON STRESS TEST
Triage Note - Nursing Documentation  Labor and Delivery Admission Log    Sharonda Saez  : 2000  MRN: [id number]  CSN: [id number]    Date in / Time in:  2021  Time in: 933    Date out / Time out:    Time out: 1025  Nurse: Jamila Alexandra RN    Patient Info: She is a 20 y.o. year old  at 35w4d with an BRENNEN of 2021, by Ultrasound who was seen on the [facility].    Chief Complaint:   Chief Complaint   Patient presents with   • Non-stress Test     Scheduled NST due to IUGR       Provider Instructions / Disposition: EFM monitoring. PO hydration. NST reactive. VSS. Urine dip. Patient d/c'd to home with  and fetal movement take home instructions.    Patient Active Problem List   Diagnosis   • Poor fetal growth affecting management of mother in third trimester       NST Documentation (Only applicable > 32 weeks): Interpretation A  Nonstress Test Interpretation A: Reactive (21 1015 : Jamila Alexandra, RN)

## 2021-04-24 NOTE — NURSING NOTE
This RN called and spoke with Dr. Morin regarding patient admission to  and NST results; telephone order received to discharge patient to home with  and fetal movement take home instructions; R/V.

## 2021-04-29 ENCOUNTER — PREP FOR SURGERY (OUTPATIENT)
Dept: OTHER | Facility: HOSPITAL | Age: 21
End: 2021-04-29

## 2021-04-29 ENCOUNTER — ROUTINE PRENATAL (OUTPATIENT)
Dept: OBSTETRICS AND GYNECOLOGY | Facility: CLINIC | Age: 21
End: 2021-04-29

## 2021-04-29 VITALS — SYSTOLIC BLOOD PRESSURE: 126 MMHG | BODY MASS INDEX: 28.74 KG/M2 | WEIGHT: 178 LBS | DIASTOLIC BLOOD PRESSURE: 66 MMHG

## 2021-04-29 DIAGNOSIS — F19.91 HISTORY OF DRUG USE: ICD-10-CM

## 2021-04-29 DIAGNOSIS — O47.9 IRREGULAR UTERINE CONTRACTIONS: ICD-10-CM

## 2021-04-29 DIAGNOSIS — Z36.85 ANTENATAL SCREENING FOR STREPTOCOCCUS B: ICD-10-CM

## 2021-04-29 DIAGNOSIS — O09.93 ENCOUNTER FOR SUPERVISION OF HIGH RISK PREGNANCY IN THIRD TRIMESTER, ANTEPARTUM: Primary | ICD-10-CM

## 2021-04-29 DIAGNOSIS — O36.5931 POOR FETAL GROWTH AFFECTING MANAGEMENT OF MOTHER IN THIRD TRIMESTER, FETUS 1 OF MULTIPLE GESTATION: ICD-10-CM

## 2021-04-29 DIAGNOSIS — O36.5931 IUGR (INTRAUTERINE GROWTH RESTRICTION) AFFECTING CARE OF MOTHER, THIRD TRIMESTER, FETUS 1: Primary | ICD-10-CM

## 2021-04-29 PROCEDURE — 99214 OFFICE O/P EST MOD 30 MIN: CPT | Performed by: OBSTETRICS & GYNECOLOGY

## 2021-04-29 RX ORDER — SODIUM CHLORIDE 0.9 % (FLUSH) 0.9 %
3-10 SYRINGE (ML) INJECTION AS NEEDED
Status: CANCELLED | OUTPATIENT
Start: 2021-04-29

## 2021-04-29 RX ORDER — SODIUM CHLORIDE, SODIUM LACTATE, POTASSIUM CHLORIDE, CALCIUM CHLORIDE 600; 310; 30; 20 MG/100ML; MG/100ML; MG/100ML; MG/100ML
125 INJECTION, SOLUTION INTRAVENOUS CONTINUOUS
Status: CANCELLED | OUTPATIENT
Start: 2021-04-29

## 2021-04-29 RX ORDER — HYDROCODONE BITARTRATE AND ACETAMINOPHEN 5; 325 MG/1; MG/1
2 TABLET ORAL ONCE AS NEEDED
Status: CANCELLED | OUTPATIENT
Start: 2021-04-29

## 2021-04-29 RX ORDER — CARBOPROST TROMETHAMINE 250 UG/ML
250 INJECTION, SOLUTION INTRAMUSCULAR AS NEEDED
Status: CANCELLED | OUTPATIENT
Start: 2021-04-29

## 2021-04-29 RX ORDER — PROMETHAZINE HYDROCHLORIDE 12.5 MG/1
12.5 SUPPOSITORY RECTAL EVERY 6 HOURS PRN
Status: CANCELLED | OUTPATIENT
Start: 2021-04-29

## 2021-04-29 RX ORDER — ONDANSETRON 2 MG/ML
4 INJECTION INTRAMUSCULAR; INTRAVENOUS ONCE AS NEEDED
Status: CANCELLED | OUTPATIENT
Start: 2021-04-29

## 2021-04-29 RX ORDER — MORPHINE SULFATE 4 MG/ML
4 INJECTION, SOLUTION INTRAMUSCULAR; INTRAVENOUS EVERY 4 HOURS PRN
Status: CANCELLED | OUTPATIENT
Start: 2021-04-29 | End: 2021-05-09

## 2021-04-29 RX ORDER — OXYTOCIN-SODIUM CHLORIDE 0.9% IV SOLN 30 UNIT/500ML 30-0.9/5 UT/ML-%
85 SOLUTION INTRAVENOUS ONCE
Status: CANCELLED | OUTPATIENT
Start: 2021-04-29 | End: 2021-04-29

## 2021-04-29 RX ORDER — OXYTOCIN-SODIUM CHLORIDE 0.9% IV SOLN 30 UNIT/500ML 30-0.9/5 UT/ML-%
1-20 SOLUTION INTRAVENOUS
Status: CANCELLED | OUTPATIENT
Start: 2021-04-29

## 2021-04-29 RX ORDER — MORPHINE SULFATE 2 MG/ML
6 INJECTION, SOLUTION INTRAMUSCULAR; INTRAVENOUS EVERY 4 HOURS PRN
Status: CANCELLED | OUTPATIENT
Start: 2021-04-29 | End: 2021-05-09

## 2021-04-29 RX ORDER — ACETAMINOPHEN 325 MG/1
650 TABLET ORAL ONCE AS NEEDED
Status: CANCELLED | OUTPATIENT
Start: 2021-04-29

## 2021-04-29 RX ORDER — MORPHINE SULFATE 4 MG/ML
4 INJECTION, SOLUTION INTRAMUSCULAR; INTRAVENOUS ONCE AS NEEDED
Status: CANCELLED | OUTPATIENT
Start: 2021-04-29

## 2021-04-29 RX ORDER — PROMETHAZINE HYDROCHLORIDE 12.5 MG/1
12.5 TABLET ORAL EVERY 6 HOURS PRN
Status: CANCELLED | OUTPATIENT
Start: 2021-04-29

## 2021-04-29 RX ORDER — LIDOCAINE HYDROCHLORIDE 10 MG/ML
5 INJECTION, SOLUTION EPIDURAL; INFILTRATION; INTRACAUDAL; PERINEURAL AS NEEDED
Status: CANCELLED | OUTPATIENT
Start: 2021-04-29

## 2021-04-29 RX ORDER — METHYLERGONOVINE MALEATE 0.2 MG/ML
200 INJECTION INTRAVENOUS ONCE AS NEEDED
Status: CANCELLED | OUTPATIENT
Start: 2021-04-29

## 2021-04-29 RX ORDER — ONDANSETRON 4 MG/1
4 TABLET, FILM COATED ORAL ONCE AS NEEDED
Status: CANCELLED | OUTPATIENT
Start: 2021-04-29

## 2021-04-29 RX ORDER — MISOPROSTOL 200 UG/1
800 TABLET ORAL AS NEEDED
Status: CANCELLED | OUTPATIENT
Start: 2021-04-29

## 2021-04-29 RX ORDER — OXYTOCIN-SODIUM CHLORIDE 0.9% IV SOLN 30 UNIT/500ML 30-0.9/5 UT/ML-%
650 SOLUTION INTRAVENOUS ONCE
Status: CANCELLED | OUTPATIENT
Start: 2021-04-29 | End: 2021-04-29

## 2021-04-29 RX ORDER — MORPHINE SULFATE 2 MG/ML
2 INJECTION, SOLUTION INTRAMUSCULAR; INTRAVENOUS ONCE AS NEEDED
Status: CANCELLED | OUTPATIENT
Start: 2021-04-29

## 2021-04-29 RX ORDER — SODIUM CHLORIDE 0.9 % (FLUSH) 0.9 %
3 SYRINGE (ML) INJECTION EVERY 12 HOURS SCHEDULED
Status: CANCELLED | OUTPATIENT
Start: 2021-04-29

## 2021-04-29 NOTE — PROGRESS NOTES
Chief Complaint  Routine Prenatal Visit (BPP, GBS done today, patient complains of pressure and spotting after intercourse. )    History of Present Illness:  Sharonda is a  currently at 36w2d who presents today with complaints of pressure as well as spotting postcoital.  Patient does report having occasional cramping and contractions.  Patient reports good fetal movement.  Patient denies any leaking of fluid.  Scan is obtained today as noted.  The patient has an NST scheduled for Saturday.    Exam:  Vitals:  See prenatal flowsheet as noted and reviewed  General: Alert, cooperative, and does not appear in any distress  Abdomen:   See prenatal flowsheet as noted and reviewed    Uterus gravid, non-tender; no palpable masses    No guarding or rebound tenderness  Pelvic:  See prenatal flowsheet as noted and reviewed  Ext:  See prenatal flowsheet as noted and reviewed    Moves extremities well, no cyanosis and no redness  Urine:  See prenatal flowsheet as noted and reviewed    Data Review:  The following data was reviewed by: Anny Hoff MD on 2021:  Prenatal Labs:  Lab Results   Component Value Date    HGB 11.1 2021    RUBELLAABIGG 1.04 2021    HEPBSAG Negative 2021    ABO A 2021    RH Positive 2021    ABSCRN Negative 2021    LDS2UGO0 Non Reactive 2021    HEPCVIRUSABY <0.1 2021       Admission on 2021, Discharged on 2021   Component Date Value   • Color 2021 Yellow    • Clarity, UA 2021 Clear    • Glucose, UA 2021 Negative    • Bilirubin 2021 Negative    • Ketones, UA 2021 Negative    • Specific Gravity  2021 1.010    • Blood, UA 2021 Negative    • pH, Urine 2021 6.5    • Protein, POC 2021 Negative    • Urobilinogen, UA 2021 Normal    • Leukocytes 2021 Negative    • Nitrite, UA 2021 Negative    Admission on 2021, Discharged on 2021   Component Date Value   • Color  04/17/2021 Yellow    • Clarity, UA 04/17/2021 Clear    • Glucose, UA 04/17/2021 Negative    • Bilirubin 04/17/2021 Negative    • Ketones, UA 04/17/2021 Negative    • Specific Gravity  04/17/2021 1.010    • Blood, UA 04/17/2021 Negative    • pH, Urine 04/17/2021 7.5    • Protein, POC 04/17/2021 Negative    • Urobilinogen, UA 04/17/2021 Normal    • Leukocytes 04/17/2021 Negative    • Nitrite, UA 04/17/2021 Negative    Admission on 04/10/2021, Discharged on 04/10/2021   Component Date Value   • Color 04/10/2021 Yellow    • Clarity, UA 04/10/2021 Clear    • Glucose, UA 04/10/2021 Negative    • Bilirubin 04/10/2021 Negative    • Ketones, UA 04/10/2021 Negative    • Specific Gravity  04/10/2021 1.010    • Blood, UA 04/10/2021 Negative    • pH, Urine 04/10/2021 7.0    • Protein, POC 04/10/2021 Negative    • Urobilinogen, UA 04/10/2021 Normal    • Leukocytes 04/10/2021 Negative    • Nitrite, UA 04/10/2021 Negative    Admission on 04/03/2021, Discharged on 04/03/2021   Component Date Value   • Color 04/03/2021 Yellow    • Clarity, UA 04/03/2021 Clear    • Glucose, UA 04/03/2021 Negative    • Bilirubin 04/03/2021 Negative    • Ketones, UA 04/03/2021 Negative    • Specific Gravity  04/03/2021 1.015    • Blood, UA 04/03/2021 Negative    • pH, Urine 04/03/2021 7.0    • Protein, POC 04/03/2021 Negative    • Urobilinogen, UA 04/03/2021 Normal    • Leukocytes 04/03/2021 Negative    • Nitrite, UA 04/03/2021 Negative    Routine Prenatal on 03/30/2021   Component Date Value   • Hepatitis B Surface Ag 03/30/2021 Negative    • Hep C Virus Ab 03/30/2021 <0.1    • RPR 03/30/2021 Non Reactive    • Rubella Antibodies, IgG 03/30/2021 1.04    • ABO Type 03/30/2021 A    • Rh Factor 03/30/2021 Positive    • Antibody Screen 03/30/2021 Negative    • HIV Screen 4th Gen w/RFX* 03/30/2021 Non Reactive    • WBC 03/30/2021 9.5    • RBC 03/30/2021 3.65*   • Hemoglobin 03/30/2021 11.1    • Hematocrit 03/30/2021 34.5    • MCV 03/30/2021 95    • MCH  2021 30.4    • MCHC 2021 32.2    • RDW 2021 12.3    • Platelets 2021 272    • Neutrophil Rel % 2021 70    • Lymphocyte Rel % 2021 22    • Monocyte Rel % 2021 7    • Eosinophil Rel % 2021 1    • Basophil Rel % 2021 0    • Neutrophils Absolute 2021 6.6    • Lymphocytes Absolute 2021 2.1    • Monocytes Absolute 2021 0.6    • Eosinophils Absolute 2021 0.1    • Basophils Absolute 2021 0.0    • Immature Granulocyte Rel* 2021 0    • Immature Grans Absolute 2021 0.0      Imaging:  US Ob Follow Up Transabdominal Approach  Sharonda Saez  : 2000  MRN: [id number]  Date: 2021    Reason for exam/History:  Growth    Ultrasound images are reviewed.  There is noted to be a viable   intrauterine pregnancy. The pregnancy is measuring 33 weeks 6 days   gestation.  The estimated fetal weight is 2189 grams at the 3.6% for   growth.  The HC was at the 6.3% and the AC was at the <2.3 %.  The   amniotic fluid index was 14.23 cms.  The fetal heart rate was normal.     The infant was in the vertex presentation.  The placental location was   noted to be anterior.      The exam limitations noted:  none    See ultrasound report for measurements and structures identified.    Anny Hoff MD, [facility]  OB GYN [facility] Fetal Biophysical Profile;Without Non-Stress Testing  Sharonda Saez  : 2000  MRN: [id number]  Date: 2021    Reason for exam/History: IUGR    Ultrasound images are reviewed.  There is a single viable intrauterine   pregnancy noted. The fetus was in the vertex presentation.  The fetal   heart rate was normal.      The biophysical profile was 8/8:  2 points for fetal breathing movements,   2 points for fetal tone, 2 points for amniotic fluid volume, and 2 points   for fetal movement.  The MARY ELLEN was 14.23 cms.    See official report for measurements and structures identified.    Anny  Luis Eduardo HARRIS, [facility]  OB GYN [place]  US Color Flow Doppler Umbilical Artery  Sharonda Saez  : 2000  MRN: [id number]  Date: 2021    Reason for exam/History: IUGR    Ultrasound images are reviewed.  There is noted to be a viable   intrauterine pregnancy.   The fetal heart rate was normal.   The fetus was   noted to be active.  The infant was in the vertex presentation.  The   umbilical artery S/D ratio is measured at 2.83.    The exam limitations noted:  none    See ultrasound report for measurements and structures identified.    Anny Hoff MD, [facility]  OB GYN [place]    Medical Records:  The patient's medical records from [facility] are reviewed today.  The patient had 2 first trimester ultrasounds.  Her first ultrasound was on  at 10 weeks gestation.  This was 7 days difference from her last menstrual cycle.  Patient was given an EDC of May 26.  Patient had repeat imaging on November 10.  The scan was also consistent with an EDC of May 26.  Patient did not have a further scan until .  Patient had her ultrasound here as noted.  Upon review of her medical records however it was noted that the patient had reported a history of IV drug abuse.    Assessment and Plan:  Problem List Items Addressed This Visit        Gravid and     Poor fetal growth affecting management of mother in third trimester  Patient with poor fetal growth as noted.  The umbilical Dopplers are reassuring.  Amniotic fluid is also normal.  Patient does report good fetal movement.  Patient is to keep her NST on Saturday.  Patient is to follow-up here on Monday with reassessment of her cervix.  Patient is scheduled for Bailey bulb induction at 37 weeks.    Relevant Orders    US Ob Follow Up Transabdominal Approach (Completed)    US Fetal Biophysical Profile;Without Non-Stress Testing (Completed)    US Color Flow Doppler Umbilical Artery  (Completed)      Other Visit Diagnoses     Encounter for supervision of high risk pregnancy in third trimester, antepartum    -  Primary  Topics discussed:     kick counts and fetal movement  PIH precautions   labor signs and symptoms  GBS obtained today     screening for streptococcus B        Relevant Orders    Strep Grp B KRISTEN + Reflex - Swab, Vaginal/Rectum    Irregular uterine contractions      Patient with irregular uterine contractions as noted.  Labor precautions and instructions are given.    History of drug use      Upon review of patient's medical records she reported a history of IV drug abuse at the beginning of her pregnancy in [place].  The patient has not had any urine drug screening.  The patient does have significant IUGR.  I have discussed with the patient and induction of labor at 37 weeks.        Follow Up/Instructions:    Patient was given instructions and counseling regarding her condition or for health maintenance advice. Please see specific information pulled into the AVS if appropriate.     Note: Speech recognition transcription software may have been used to dictate portions of this document.  An attempt at proofreading has been made though minor errors in transcription may still be present.    This note was electronically signed.  Anny Hoff M.D.

## 2021-05-01 ENCOUNTER — HOSPITAL ENCOUNTER (OUTPATIENT)
Dept: LABOR AND DELIVERY | Facility: HOSPITAL | Age: 21
Discharge: HOME | End: 2021-05-01

## 2021-05-01 ENCOUNTER — HOSPITAL ENCOUNTER (OUTPATIENT)
Facility: HOSPITAL | Age: 21
Discharge: HOME | End: 2021-05-01
Attending: NURSE PRACTITIONER | Admitting: NURSE PRACTITIONER

## 2021-05-01 VITALS
TEMPERATURE: 98.4 F | DIASTOLIC BLOOD PRESSURE: 66 MMHG | OXYGEN SATURATION: 100 % | RESPIRATION RATE: 16 BRPM | HEART RATE: 63 BPM | WEIGHT: 176 LBS | SYSTOLIC BLOOD PRESSURE: 118 MMHG | HEIGHT: 66 IN | BODY MASS INDEX: 28.28 KG/M2

## 2021-05-01 LAB
BILIRUB BLD-MCNC: NEGATIVE MG/DL
CLARITY, POC: CLEAR
COLOR UR: YELLOW
GLUCOSE UR STRIP-MCNC: NEGATIVE MG/DL
KETONES UR QL: NEGATIVE
LEUKOCYTE EST, POC: NEGATIVE
NITRITE UR-MCNC: NEGATIVE MG/ML
PH UR: 6 [PH] (ref 5–8)
PROT UR STRIP-MCNC: NEGATIVE MG/DL
RBC # UR STRIP: NEGATIVE /UL
SP GR UR: 1.01 (ref 1–1.03)
UROBILINOGEN UR QL: NORMAL

## 2021-05-01 PROCEDURE — 59025 FETAL NON-STRESS TEST: CPT | Performed by: NURSE PRACTITIONER

## 2021-05-01 PROCEDURE — 81002 URINALYSIS NONAUTO W/O SCOPE: CPT | Performed by: NURSE PRACTITIONER

## 2021-05-01 PROCEDURE — G0463 HOSPITAL OUTPT CLINIC VISIT: HCPCS

## 2021-05-01 PROCEDURE — 59025 FETAL NON-STRESS TEST: CPT

## 2021-05-03 ENCOUNTER — ROUTINE PRENATAL (OUTPATIENT)
Dept: OBSTETRICS AND GYNECOLOGY | Facility: CLINIC | Age: 21
End: 2021-05-03

## 2021-05-03 VITALS — WEIGHT: 181 LBS | DIASTOLIC BLOOD PRESSURE: 62 MMHG | SYSTOLIC BLOOD PRESSURE: 122 MMHG | BODY MASS INDEX: 29.21 KG/M2

## 2021-05-03 DIAGNOSIS — O36.5930 POOR FETAL GROWTH AFFECTING MANAGEMENT OF MOTHER IN THIRD TRIMESTER, SINGLE OR UNSPECIFIED FETUS: Primary | ICD-10-CM

## 2021-05-03 LAB
CLINDAMYCIN ISLT KB: ABNORMAL
GP B STREP DNA SPEC QL NAA+PROBE: POSITIVE
ORGANISM ID: ABNORMAL

## 2021-05-03 PROCEDURE — 99213 OFFICE O/P EST LOW 20 MIN: CPT | Performed by: MIDWIFE

## 2021-05-03 NOTE — PROGRESS NOTES
Chief Complaint   Patient presents with   • Routine Prenatal Visit     Patient unsure if having contractions, is having some pressure.        HPI: Sharonda is a  currently at 36w6d who today reports the following:  She is having an increase in pelvic pressure. Unsure if she is having contractions. Is wondering when she will be induced. Baby is active.      EXAM:   Vitals:  See prenatal flowsheet, BP 12/62, Wt +5#   Abdomen:   See prenatal flowsheet, soft nontender   Pelvic:  See prenatal flowsheet /, soft and posterior   Urine:  See prenatal flowsheet    Prenatal Labs  Lab Results   Component Value Date    HGB 11.1 2021    HEPBSAG Negative 2021    ABO A 2021    RH Positive 2021    ABSCRN Negative 2021    HYX5CTN5 Non Reactive 2021    HEPCVIRUSABY <0.1 2021       MDM:  Impression: IUGR   GBS pending   Tests done today: none   Topics discussed: induction of labor  kick counts and fetal movement  labor signs and symptoms  pain management options for labor   Prenatal labs/ US reviewed   Tests next visit: none   Next visit: Bailey bulb induction  @ 1600     This note was electronically signed.  COURTNEY Wilson  5/3/2021

## 2021-05-04 ENCOUNTER — ANESTHESIA (OUTPATIENT)
Dept: LABOR AND DELIVERY | Facility: HOSPITAL | Age: 21
End: 2021-05-04

## 2021-05-04 ENCOUNTER — HOSPITAL ENCOUNTER (INPATIENT)
Facility: HOSPITAL | Age: 21
LOS: 3 days | Discharge: HOME | End: 2021-05-07
Attending: NURSE PRACTITIONER | Admitting: OBSTETRICS & GYNECOLOGY

## 2021-05-04 ENCOUNTER — HOSPITAL ENCOUNTER (OUTPATIENT)
Dept: LABOR AND DELIVERY | Facility: HOSPITAL | Age: 21
Discharge: HOME | End: 2021-05-04

## 2021-05-04 ENCOUNTER — ANESTHESIA EVENT (OUTPATIENT)
Dept: LABOR AND DELIVERY | Facility: HOSPITAL | Age: 21
End: 2021-05-04

## 2021-05-04 DIAGNOSIS — O36.5931 IUGR (INTRAUTERINE GROWTH RESTRICTION) AFFECTING CARE OF MOTHER, THIRD TRIMESTER, FETUS 1: ICD-10-CM

## 2021-05-04 PROBLEM — Z34.90 PREGNANCY: Status: ACTIVE | Noted: 2021-05-04

## 2021-05-04 LAB
ABO GROUP BLD: NORMAL
ABO GROUP BLD: NORMAL
BASOPHILS # BLD AUTO: 0.03 10*3/MM3 (ref 0–0.2)
BASOPHILS NFR BLD AUTO: 0.3 % (ref 0–1.5)
BLD GP AB SCN SERPL QL: NEGATIVE
DEPRECATED RDW RBC AUTO: 46.1 FL (ref 37–54)
EOSINOPHIL # BLD AUTO: 0.05 10*3/MM3 (ref 0–0.4)
EOSINOPHIL NFR BLD AUTO: 0.5 % (ref 0.3–6.2)
ERYTHROCYTE [DISTWIDTH] IN BLOOD BY AUTOMATED COUNT: 13.4 % (ref 12.3–15.4)
HCT VFR BLD AUTO: 33.7 % (ref 34–46.6)
HGB BLD-MCNC: 11.2 G/DL (ref 12–15.9)
IMM GRANULOCYTES # BLD AUTO: 0.08 10*3/MM3 (ref 0–0.05)
IMM GRANULOCYTES NFR BLD AUTO: 0.8 % (ref 0–0.5)
LYMPHOCYTES # BLD AUTO: 2.65 10*3/MM3 (ref 0.7–3.1)
LYMPHOCYTES NFR BLD AUTO: 26.6 % (ref 19.6–45.3)
MCH RBC QN AUTO: 31.1 PG (ref 26.6–33)
MCHC RBC AUTO-ENTMCNC: 33.2 G/DL (ref 31.5–35.7)
MCV RBC AUTO: 93.6 FL (ref 79–97)
MONOCYTES # BLD AUTO: 0.7 10*3/MM3 (ref 0.1–0.9)
MONOCYTES NFR BLD AUTO: 7 % (ref 5–12)
NEUTROPHILS NFR BLD AUTO: 6.47 10*3/MM3 (ref 1.7–7)
NEUTROPHILS NFR BLD AUTO: 64.8 % (ref 42.7–76)
NRBC BLD AUTO-RTO: 0 /100 WBC (ref 0–0.2)
PLATELET # BLD AUTO: 249 10*3/MM3 (ref 140–450)
PMV BLD AUTO: 9.7 FL (ref 6–12)
RBC # BLD AUTO: 3.6 10*6/MM3 (ref 3.77–5.28)
RH BLD: POSITIVE
RH BLD: POSITIVE
SARS-COV-2 RNA PNL SPEC NAA+PROBE: NOT DETECTED
T&S EXPIRATION DATE: NORMAL
WBC # BLD AUTO: 9.98 10*3/MM3 (ref 3.4–10.8)

## 2021-05-04 PROCEDURE — 85025 COMPLETE CBC W/AUTO DIFF WBC: CPT | Performed by: OBSTETRICS & GYNECOLOGY

## 2021-05-04 PROCEDURE — 25010000002 PENICILLIN G POTASSIUM PER 600000 UNITS: Performed by: NURSE PRACTITIONER

## 2021-05-04 PROCEDURE — 80306 DRUG TEST PRSMV INSTRMNT: CPT | Performed by: MIDWIFE

## 2021-05-04 PROCEDURE — 86901 BLOOD TYPING SEROLOGIC RH(D): CPT | Performed by: OBSTETRICS & GYNECOLOGY

## 2021-05-04 PROCEDURE — 86900 BLOOD TYPING SEROLOGIC ABO: CPT

## 2021-05-04 PROCEDURE — 25010000002 MORPHINE SULFATE (PF) 2 MG/ML SOLUTION: Performed by: OBSTETRICS & GYNECOLOGY

## 2021-05-04 PROCEDURE — 86900 BLOOD TYPING SEROLOGIC ABO: CPT | Performed by: OBSTETRICS & GYNECOLOGY

## 2021-05-04 PROCEDURE — S0260 H&P FOR SURGERY: HCPCS | Performed by: NURSE PRACTITIONER

## 2021-05-04 PROCEDURE — 86850 RBC ANTIBODY SCREEN: CPT | Performed by: OBSTETRICS & GYNECOLOGY

## 2021-05-04 PROCEDURE — 59025 FETAL NON-STRESS TEST: CPT

## 2021-05-04 PROCEDURE — 63710000001 PROMETHAZINE PER 12.5 MG: Performed by: OBSTETRICS & GYNECOLOGY

## 2021-05-04 PROCEDURE — 86901 BLOOD TYPING SEROLOGIC RH(D): CPT

## 2021-05-04 PROCEDURE — 59025 FETAL NON-STRESS TEST: CPT | Performed by: NURSE PRACTITIONER

## 2021-05-04 PROCEDURE — 87635 SARS-COV-2 COVID-19 AMP PRB: CPT | Performed by: NURSE PRACTITIONER

## 2021-05-04 RX ORDER — MISOPROSTOL 200 UG/1
800 TABLET ORAL AS NEEDED
Status: DISCONTINUED | OUTPATIENT
Start: 2021-05-04 | End: 2021-05-05 | Stop reason: HOSPADM

## 2021-05-04 RX ORDER — LIDOCAINE HYDROCHLORIDE 10 MG/ML
5 INJECTION, SOLUTION EPIDURAL; INFILTRATION; INTRACAUDAL; PERINEURAL AS NEEDED
Status: DISCONTINUED | OUTPATIENT
Start: 2021-05-04 | End: 2021-05-05 | Stop reason: HOSPADM

## 2021-05-04 RX ORDER — PENICILLIN G 3000000 [IU]/50ML
3 INJECTION, SOLUTION INTRAVENOUS
Status: DISCONTINUED | OUTPATIENT
Start: 2021-05-04 | End: 2021-05-05 | Stop reason: HOSPADM

## 2021-05-04 RX ORDER — SODIUM CHLORIDE 0.9 % (FLUSH) 0.9 %
3 SYRINGE (ML) INJECTION EVERY 12 HOURS SCHEDULED
Status: DISCONTINUED | OUTPATIENT
Start: 2021-05-04 | End: 2021-05-05 | Stop reason: HOSPADM

## 2021-05-04 RX ORDER — ACETAMINOPHEN 325 MG/1
650 TABLET ORAL ONCE AS NEEDED
Status: DISCONTINUED | OUTPATIENT
Start: 2021-05-04 | End: 2021-05-05 | Stop reason: HOSPADM

## 2021-05-04 RX ORDER — HYDROCODONE BITARTRATE AND ACETAMINOPHEN 5; 325 MG/1; MG/1
2 TABLET ORAL ONCE AS NEEDED
Status: DISCONTINUED | OUTPATIENT
Start: 2021-05-04 | End: 2021-05-05 | Stop reason: HOSPADM

## 2021-05-04 RX ORDER — OXYTOCIN-SODIUM CHLORIDE 0.9% IV SOLN 30 UNIT/500ML 30-0.9/5 UT/ML-%
650 SOLUTION INTRAVENOUS ONCE
Status: COMPLETED | OUTPATIENT
Start: 2021-05-04 | End: 2021-05-05

## 2021-05-04 RX ORDER — MORPHINE SULFATE 2 MG/ML
6 INJECTION, SOLUTION INTRAMUSCULAR; INTRAVENOUS EVERY 4 HOURS PRN
Status: DISCONTINUED | OUTPATIENT
Start: 2021-05-04 | End: 2021-05-05 | Stop reason: HOSPADM

## 2021-05-04 RX ORDER — SODIUM CHLORIDE, SODIUM LACTATE, POTASSIUM CHLORIDE, CALCIUM CHLORIDE 600; 310; 30; 20 MG/100ML; MG/100ML; MG/100ML; MG/100ML
125 INJECTION, SOLUTION INTRAVENOUS CONTINUOUS
Status: DISCONTINUED | OUTPATIENT
Start: 2021-05-04 | End: 2021-05-05

## 2021-05-04 RX ORDER — PROMETHAZINE HYDROCHLORIDE 12.5 MG/1
12.5 TABLET ORAL EVERY 6 HOURS PRN
Status: DISCONTINUED | OUTPATIENT
Start: 2021-05-04 | End: 2021-05-05 | Stop reason: HOSPADM

## 2021-05-04 RX ORDER — PROMETHAZINE HYDROCHLORIDE 12.5 MG/1
12.5 SUPPOSITORY RECTAL EVERY 6 HOURS PRN
Status: DISCONTINUED | OUTPATIENT
Start: 2021-05-04 | End: 2021-05-05 | Stop reason: HOSPADM

## 2021-05-04 RX ORDER — PENICILLIN G 3000000 [IU]/50ML
3 INJECTION, SOLUTION INTRAVENOUS
Status: COMPLETED | OUTPATIENT
Start: 2021-05-04 | End: 2021-05-04

## 2021-05-04 RX ORDER — OXYTOCIN-SODIUM CHLORIDE 0.9% IV SOLN 30 UNIT/500ML 30-0.9/5 UT/ML-%
85 SOLUTION INTRAVENOUS ONCE
Status: DISCONTINUED | OUTPATIENT
Start: 2021-05-04 | End: 2021-05-05 | Stop reason: HOSPADM

## 2021-05-04 RX ORDER — MORPHINE SULFATE 2 MG/ML
2 INJECTION, SOLUTION INTRAMUSCULAR; INTRAVENOUS ONCE AS NEEDED
Status: DISCONTINUED | OUTPATIENT
Start: 2021-05-04 | End: 2021-05-05 | Stop reason: HOSPADM

## 2021-05-04 RX ORDER — CARBOPROST TROMETHAMINE 250 UG/ML
250 INJECTION, SOLUTION INTRAMUSCULAR AS NEEDED
Status: DISCONTINUED | OUTPATIENT
Start: 2021-05-04 | End: 2021-05-05 | Stop reason: HOSPADM

## 2021-05-04 RX ORDER — EPHEDRINE SULFATE 5 MG/ML
5 INJECTION INTRAVENOUS
Status: DISCONTINUED | OUTPATIENT
Start: 2021-05-04 | End: 2021-05-05 | Stop reason: SDUPTHER

## 2021-05-04 RX ORDER — MORPHINE SULFATE 4 MG/ML
4 INJECTION, SOLUTION INTRAMUSCULAR; INTRAVENOUS ONCE AS NEEDED
Status: DISCONTINUED | OUTPATIENT
Start: 2021-05-04 | End: 2021-05-05 | Stop reason: HOSPADM

## 2021-05-04 RX ORDER — ONDANSETRON 4 MG/1
4 TABLET, FILM COATED ORAL ONCE AS NEEDED
Status: DISCONTINUED | OUTPATIENT
Start: 2021-05-04 | End: 2021-05-05 | Stop reason: HOSPADM

## 2021-05-04 RX ORDER — OXYTOCIN-SODIUM CHLORIDE 0.9% IV SOLN 30 UNIT/500ML 30-0.9/5 UT/ML-%
1-20 SOLUTION INTRAVENOUS
Status: DISCONTINUED | OUTPATIENT
Start: 2021-05-04 | End: 2021-05-05 | Stop reason: HOSPADM

## 2021-05-04 RX ORDER — MORPHINE SULFATE 4 MG/ML
4 INJECTION, SOLUTION INTRAMUSCULAR; INTRAVENOUS EVERY 4 HOURS PRN
Status: DISCONTINUED | OUTPATIENT
Start: 2021-05-04 | End: 2021-05-05 | Stop reason: HOSPADM

## 2021-05-04 RX ORDER — ONDANSETRON 2 MG/ML
4 INJECTION INTRAMUSCULAR; INTRAVENOUS ONCE AS NEEDED
Status: DISCONTINUED | OUTPATIENT
Start: 2021-05-04 | End: 2021-05-05 | Stop reason: HOSPADM

## 2021-05-04 RX ORDER — TRISODIUM CITRATE DIHYDRATE AND CITRIC ACID MONOHYDRATE 500; 334 MG/5ML; MG/5ML
30 SOLUTION ORAL ONCE
Status: DISCONTINUED | OUTPATIENT
Start: 2021-05-04 | End: 2021-05-05 | Stop reason: SDUPTHER

## 2021-05-04 RX ORDER — SODIUM CHLORIDE 0.9 % (FLUSH) 0.9 %
3-10 SYRINGE (ML) INJECTION AS NEEDED
Status: DISCONTINUED | OUTPATIENT
Start: 2021-05-04 | End: 2021-05-05 | Stop reason: HOSPADM

## 2021-05-04 RX ORDER — METHYLERGONOVINE MALEATE 0.2 MG/ML
200 INJECTION INTRAVENOUS ONCE AS NEEDED
Status: DISCONTINUED | OUTPATIENT
Start: 2021-05-04 | End: 2021-05-05 | Stop reason: HOSPADM

## 2021-05-04 RX ADMIN — SODIUM CHLORIDE, POTASSIUM CHLORIDE, SODIUM LACTATE AND CALCIUM CHLORIDE 125 ML/HR: 600; 310; 30; 20 INJECTION, SOLUTION INTRAVENOUS at 17:03

## 2021-05-04 RX ADMIN — PROMETHAZINE HYDROCHLORIDE 12.5 MG: 12.5 TABLET ORAL at 20:39

## 2021-05-04 RX ADMIN — Medication 1 MILLI-UNITS/MIN: at 20:47

## 2021-05-04 RX ADMIN — PENICILLIN G 3 MILLION UNITS: 3000000 INJECTION, SOLUTION INTRAVENOUS at 20:41

## 2021-05-04 RX ADMIN — MORPHINE SULFATE 6 MG: 2 INJECTION, SOLUTION INTRAMUSCULAR; INTRAVENOUS at 20:38

## 2021-05-04 RX ADMIN — PENICILLIN G 3 MILLION UNITS: 3000000 INJECTION, SOLUTION INTRAVENOUS at 17:58

## 2021-05-04 RX ADMIN — PENICILLIN G 3 MILLION UNITS: 3000000 INJECTION, SOLUTION INTRAVENOUS at 17:11

## 2021-05-05 LAB
AMPHET+METHAMPHET UR QL: NEGATIVE
AMPHETAMINES UR QL: NEGATIVE
BARBITURATES UR QL SCN: NEGATIVE
BENZODIAZ UR QL SCN: NEGATIVE
BUPRENORPHINE SERPL-MCNC: NEGATIVE NG/ML
CANNABINOIDS SERPL QL: POSITIVE
COCAINE UR QL: NEGATIVE
METHADONE UR QL SCN: NEGATIVE
OPIATES UR QL: NEGATIVE
OXYCODONE UR QL SCN: NEGATIVE
PCP UR QL SCN: NEGATIVE
PROPOXYPH UR QL: NEGATIVE
TRICYCLICS UR QL SCN: NEGATIVE

## 2021-05-05 PROCEDURE — 51703 INSERT BLADDER CATH COMPLEX: CPT

## 2021-05-05 PROCEDURE — C1755 CATHETER, INTRASPINAL: HCPCS | Performed by: NURSE ANESTHETIST, CERTIFIED REGISTERED

## 2021-05-05 PROCEDURE — 59410 OBSTETRICAL CARE: CPT | Performed by: MIDWIFE

## 2021-05-05 PROCEDURE — 25010000002 ROPIVACAINE PER 1 MG: Performed by: NURSE ANESTHETIST, CERTIFIED REGISTERED

## 2021-05-05 PROCEDURE — 3E033VJ INTRODUCTION OF OTHER HORMONE INTO PERIPHERAL VEIN, PERCUTANEOUS APPROACH: ICD-10-PCS | Performed by: MIDWIFE

## 2021-05-05 PROCEDURE — 0UQGXZZ REPAIR VAGINA, EXTERNAL APPROACH: ICD-10-PCS | Performed by: MIDWIFE

## 2021-05-05 PROCEDURE — 25010000002 FENTANYL CITRATE (PF) 250 MCG/5ML SOLUTION 5 ML VIAL: Performed by: NURSE ANESTHETIST, CERTIFIED REGISTERED

## 2021-05-05 PROCEDURE — 25010000002 PENICILLIN G POTASSIUM PER 600000 UNITS: Performed by: NURSE PRACTITIONER

## 2021-05-05 PROCEDURE — 25010000002 MORPHINE SULFATE (PF) 2 MG/ML SOLUTION: Performed by: OBSTETRICS & GYNECOLOGY

## 2021-05-05 PROCEDURE — 10907ZC DRAINAGE OF AMNIOTIC FLUID, THERAPEUTIC FROM PRODUCTS OF CONCEPTION, VIA NATURAL OR ARTIFICIAL OPENING: ICD-10-PCS | Performed by: MIDWIFE

## 2021-05-05 RX ORDER — ONDANSETRON 4 MG/1
4 TABLET, FILM COATED ORAL EVERY 8 HOURS PRN
Status: DISCONTINUED | OUTPATIENT
Start: 2021-05-05 | End: 2021-05-07 | Stop reason: HOSPADM

## 2021-05-05 RX ORDER — HYDROCORTISONE 25 MG/G
1 CREAM TOPICAL AS NEEDED
Status: DISCONTINUED | OUTPATIENT
Start: 2021-05-05 | End: 2021-05-07 | Stop reason: HOSPADM

## 2021-05-05 RX ORDER — PRENATAL VIT/IRON FUM/FOLIC AC 27MG-0.8MG
1 TABLET ORAL DAILY
Status: DISCONTINUED | OUTPATIENT
Start: 2021-05-05 | End: 2021-05-07 | Stop reason: HOSPADM

## 2021-05-05 RX ORDER — PROMETHAZINE HYDROCHLORIDE 25 MG/1
25 TABLET ORAL EVERY 6 HOURS PRN
Status: DISCONTINUED | OUTPATIENT
Start: 2021-05-05 | End: 2021-05-07 | Stop reason: HOSPADM

## 2021-05-05 RX ORDER — MAGNESIUM CARB/ALUMINUM HYDROX 105-160MG
30 TABLET,CHEWABLE ORAL DAILY PRN
Status: DISCONTINUED | OUTPATIENT
Start: 2021-05-05 | End: 2021-05-05

## 2021-05-05 RX ORDER — FAMOTIDINE 10 MG/ML
20 INJECTION, SOLUTION INTRAVENOUS ONCE
Status: COMPLETED | OUTPATIENT
Start: 2021-05-05 | End: 2021-05-05

## 2021-05-05 RX ORDER — DOCUSATE SODIUM 100 MG/1
100 CAPSULE, LIQUID FILLED ORAL 2 TIMES DAILY
Status: DISCONTINUED | OUTPATIENT
Start: 2021-05-05 | End: 2021-05-07 | Stop reason: HOSPADM

## 2021-05-05 RX ORDER — ONDANSETRON 2 MG/ML
4 INJECTION INTRAMUSCULAR; INTRAVENOUS EVERY 6 HOURS PRN
Status: DISCONTINUED | OUTPATIENT
Start: 2021-05-05 | End: 2021-05-07 | Stop reason: HOSPADM

## 2021-05-05 RX ORDER — LANOLIN
CREAM (GRAM) TOPICAL
Status: DISCONTINUED | OUTPATIENT
Start: 2021-05-05 | End: 2021-05-07 | Stop reason: HOSPADM

## 2021-05-05 RX ORDER — SODIUM CHLORIDE 0.9 % (FLUSH) 0.9 %
1-10 SYRINGE (ML) INJECTION AS NEEDED
Status: DISCONTINUED | OUTPATIENT
Start: 2021-05-05 | End: 2021-05-07 | Stop reason: HOSPADM

## 2021-05-05 RX ORDER — BISACODYL 10 MG
10 SUPPOSITORY, RECTAL RECTAL DAILY PRN
Status: DISCONTINUED | OUTPATIENT
Start: 2021-05-06 | End: 2021-05-07 | Stop reason: HOSPADM

## 2021-05-05 RX ORDER — HYDROCODONE BITARTRATE AND ACETAMINOPHEN 5; 325 MG/1; MG/1
1 TABLET ORAL EVERY 4 HOURS PRN
Status: DISCONTINUED | OUTPATIENT
Start: 2021-05-05 | End: 2021-05-07 | Stop reason: HOSPADM

## 2021-05-05 RX ORDER — PROMETHAZINE HYDROCHLORIDE 12.5 MG/1
12.5 SUPPOSITORY RECTAL EVERY 6 HOURS PRN
Status: DISCONTINUED | OUTPATIENT
Start: 2021-05-05 | End: 2021-05-07 | Stop reason: HOSPADM

## 2021-05-05 RX ORDER — HYDROCODONE BITARTRATE AND ACETAMINOPHEN 7.5; 325 MG/1; MG/1
1 TABLET ORAL EVERY 4 HOURS PRN
Status: DISCONTINUED | OUTPATIENT
Start: 2021-05-05 | End: 2021-05-07 | Stop reason: HOSPADM

## 2021-05-05 RX ORDER — TRISODIUM CITRATE DIHYDRATE AND CITRIC ACID MONOHYDRATE 500; 334 MG/5ML; MG/5ML
30 SOLUTION ORAL ONCE
Status: DISCONTINUED | OUTPATIENT
Start: 2021-05-05 | End: 2021-05-05 | Stop reason: HOSPADM

## 2021-05-05 RX ORDER — IBUPROFEN 600 MG/1
600 TABLET ORAL EVERY 6 HOURS PRN
Status: DISCONTINUED | OUTPATIENT
Start: 2021-05-05 | End: 2021-05-07 | Stop reason: HOSPADM

## 2021-05-05 RX ORDER — EPHEDRINE SULFATE 5 MG/ML
5 INJECTION INTRAVENOUS
Status: DISCONTINUED | OUTPATIENT
Start: 2021-05-05 | End: 2021-05-05 | Stop reason: HOSPADM

## 2021-05-05 RX ADMIN — PENICILLIN G 3 MILLION UNITS: 3000000 INJECTION, SOLUTION INTRAVENOUS at 05:03

## 2021-05-05 RX ADMIN — FAMOTIDINE 20 MG: 10 INJECTION INTRAVENOUS at 15:08

## 2021-05-05 RX ADMIN — DOCUSATE SODIUM 100 MG: 100 CAPSULE, LIQUID FILLED ORAL at 20:59

## 2021-05-05 RX ADMIN — BENZOCAINE AND LEVOMENTHOL: 200; 5 SPRAY TOPICAL at 20:59

## 2021-05-05 RX ADMIN — PENICILLIN G 3 MILLION UNITS: 3000000 INJECTION, SOLUTION INTRAVENOUS at 00:41

## 2021-05-05 RX ADMIN — PENICILLIN G 3 MILLION UNITS: 3000000 INJECTION, SOLUTION INTRAVENOUS at 13:14

## 2021-05-05 RX ADMIN — Medication 650 ML/HR: at 16:53

## 2021-05-05 RX ADMIN — SODIUM CHLORIDE, POTASSIUM CHLORIDE, SODIUM LACTATE AND CALCIUM CHLORIDE 125 ML/HR: 600; 310; 30; 20 INJECTION, SOLUTION INTRAVENOUS at 03:00

## 2021-05-05 RX ADMIN — ROPIVACAINE HYDROCHLORIDE 14 ML/HR: 2 INJECTION, SOLUTION EPIDURAL; INFILTRATION at 08:00

## 2021-05-05 RX ADMIN — PENICILLIN G 3 MILLION UNITS: 3000000 INJECTION, SOLUTION INTRAVENOUS at 09:13

## 2021-05-05 RX ADMIN — SODIUM CHLORIDE, POTASSIUM CHLORIDE, SODIUM LACTATE AND CALCIUM CHLORIDE 125 ML/HR: 600; 310; 30; 20 INJECTION, SOLUTION INTRAVENOUS at 09:04

## 2021-05-05 RX ADMIN — IBUPROFEN 600 MG: 600 TABLET ORAL at 20:59

## 2021-05-05 RX ADMIN — MORPHINE SULFATE 6 MG: 2 INJECTION, SOLUTION INTRAMUSCULAR; INTRAVENOUS at 00:46

## 2021-05-05 RX ADMIN — MINERAL OIL 30 ML: 1000 SOLUTION ORAL at 16:30

## 2021-05-05 NOTE — ANESTHESIA PROCEDURE NOTES
Labor Epidural      Patient location during procedure: OB  Indication:at surgeon's request  Performed By  CRNA: Jose Ann CRNA  Preanesthetic Checklist  Completed: patient identified, IV checked, site marked, risks and benefits discussed, surgical consent, monitors and equipment checked, pre-op evaluation and timeout performed  Additional Notes  Test dose negative  Prep:  Pt Position:sitting  Sterile Tech:cap, gloves, mask and sterile barrier  Prep:chlorhexidine gluconate and isopropyl alcohol  Monitoring:blood pressure monitoring and continuous pulse oximetry  Epidural Block Procedure:  Approach:midline  Guidance:landmark technique and palpation technique  Location:L4-L5  Needle Type:Tuohy  Needle Gauge:18 G  Loss of Resistance: 6cm  Cath Depth at skin:11 cm  Paresthesia: transient and left  Aspiration:negative  Test Dose:negative  Med administered at 5/5/2021 8:00 AM  Number of Attempts: 1  Post Assessment:  Dressing:secured with tape  Pt Tolerance:patient tolerated the procedure well with no apparent complications  Complications:no

## 2021-05-05 NOTE — ANESTHESIA PREPROCEDURE EVALUATION
Anesthesia Evaluation     Patient summary reviewed and Nursing notes reviewed   NPO Solid Status: > 8 hours             Airway   Mallampati: I  TM distance: >3 FB  Neck ROM: full  No difficulty expected  Dental - normal exam     Pulmonary - normal exam   (+) a smoker Former, asthma,  Cardiovascular - negative cardio ROS and normal exam        Neuro/Psych- negative ROS  GI/Hepatic/Renal/Endo - negative ROS     Musculoskeletal (-) negative ROS    Abdominal  - normal exam    Bowel sounds: normal.   Substance History - negative use     OB/GYN    (+) Pregnant,         Other                        Anesthesia Plan    ASA 2 - emergent     epidural     intravenous induction     Anesthetic plan, all risks, benefits, and alternatives have been provided, discussed and informed consent has been obtained with: patient.    Plan discussed with attending.

## 2021-05-06 LAB
BASOPHILS # BLD AUTO: 0.04 10*3/MM3 (ref 0–0.2)
BASOPHILS NFR BLD AUTO: 0.3 % (ref 0–1.5)
DEPRECATED RDW RBC AUTO: 44.9 FL (ref 37–54)
EOSINOPHIL # BLD AUTO: 0.09 10*3/MM3 (ref 0–0.4)
EOSINOPHIL NFR BLD AUTO: 0.8 % (ref 0.3–6.2)
ERYTHROCYTE [DISTWIDTH] IN BLOOD BY AUTOMATED COUNT: 13.3 % (ref 12.3–15.4)
HCT VFR BLD AUTO: 31.9 % (ref 34–46.6)
HGB BLD-MCNC: 10.8 G/DL (ref 12–15.9)
IMM GRANULOCYTES # BLD AUTO: 0.1 10*3/MM3 (ref 0–0.05)
IMM GRANULOCYTES NFR BLD AUTO: 0.9 % (ref 0–0.5)
LYMPHOCYTES # BLD AUTO: 3.07 10*3/MM3 (ref 0.7–3.1)
LYMPHOCYTES NFR BLD AUTO: 26.1 % (ref 19.6–45.3)
MCH RBC QN AUTO: 31.1 PG (ref 26.6–33)
MCHC RBC AUTO-ENTMCNC: 33.9 G/DL (ref 31.5–35.7)
MCV RBC AUTO: 91.9 FL (ref 79–97)
MONOCYTES # BLD AUTO: 0.78 10*3/MM3 (ref 0.1–0.9)
MONOCYTES NFR BLD AUTO: 6.6 % (ref 5–12)
NEUTROPHILS NFR BLD AUTO: 65.3 % (ref 42.7–76)
NEUTROPHILS NFR BLD AUTO: 7.67 10*3/MM3 (ref 1.7–7)
NRBC BLD AUTO-RTO: 0 /100 WBC (ref 0–0.2)
PLATELET # BLD AUTO: 204 10*3/MM3 (ref 140–450)
PMV BLD AUTO: 9.7 FL (ref 6–12)
RBC # BLD AUTO: 3.47 10*6/MM3 (ref 3.77–5.28)
WBC # BLD AUTO: 11.75 10*3/MM3 (ref 3.4–10.8)

## 2021-05-06 PROCEDURE — 85025 COMPLETE CBC W/AUTO DIFF WBC: CPT | Performed by: MIDWIFE

## 2021-05-06 PROCEDURE — 25010000002 TDAP 5-2.5-18.5 LF-MCG/0.5 SUSPENSION: Performed by: MIDWIFE

## 2021-05-06 PROCEDURE — 90715 TDAP VACCINE 7 YRS/> IM: CPT | Performed by: MIDWIFE

## 2021-05-06 PROCEDURE — 90471 IMMUNIZATION ADMIN: CPT | Performed by: MIDWIFE

## 2021-05-06 PROCEDURE — 0503F POSTPARTUM CARE VISIT: CPT | Performed by: NURSE PRACTITIONER

## 2021-05-06 RX ORDER — FERROUS SULFATE TAB EC 324 MG (65 MG FE EQUIVALENT) 324 (65 FE) MG
324 TABLET DELAYED RESPONSE ORAL 2 TIMES DAILY WITH MEALS
Status: DISCONTINUED | OUTPATIENT
Start: 2021-05-06 | End: 2021-05-07 | Stop reason: HOSPADM

## 2021-05-06 RX ADMIN — PRENATAL VITAMINS-IRON FUMARATE 27 MG IRON-FOLIC ACID 0.8 MG TABLET 1 TABLET: at 08:35

## 2021-05-06 RX ADMIN — PRENATAL VITAMINS-IRON FUMARATE 27 MG IRON-FOLIC ACID 0.8 MG TABLET 1 TABLET: at 00:44

## 2021-05-06 RX ADMIN — IBUPROFEN 600 MG: 600 TABLET ORAL at 17:07

## 2021-05-06 RX ADMIN — FERROUS SULFATE TAB EC 324 MG (65 MG FE EQUIVALENT) 324 MG: 324 (65 FE) TABLET DELAYED RESPONSE at 17:07

## 2021-05-06 RX ADMIN — HYDROCODONE BITARTRATE AND ACETAMINOPHEN 1 TABLET: 5; 325 TABLET ORAL at 18:08

## 2021-05-06 RX ADMIN — DOCUSATE SODIUM 100 MG: 100 CAPSULE, LIQUID FILLED ORAL at 21:48

## 2021-05-06 RX ADMIN — IBUPROFEN 600 MG: 600 TABLET ORAL at 05:35

## 2021-05-06 RX ADMIN — DOCUSATE SODIUM 100 MG: 100 CAPSULE, LIQUID FILLED ORAL at 08:35

## 2021-05-06 RX ADMIN — TETANUS TOXOID, REDUCED DIPHTHERIA TOXOID AND ACELLULAR PERTUSSIS VACCINE, ADSORBED 0.5 ML: 5; 2.5; 8; 8; 2.5 SUSPENSION INTRAMUSCULAR at 10:52

## 2021-05-06 RX ADMIN — FERROUS SULFATE TAB EC 324 MG (65 MG FE EQUIVALENT) 324 MG: 324 (65 FE) TABLET DELAYED RESPONSE at 08:35

## 2021-05-06 RX ADMIN — HYDROCODONE BITARTRATE AND ACETAMINOPHEN 1 TABLET: 5; 325 TABLET ORAL at 21:48

## 2021-05-06 RX ADMIN — HYDROCODONE BITARTRATE AND ACETAMINOPHEN 1 TABLET: 5; 325 TABLET ORAL at 00:43

## 2021-05-06 RX ADMIN — HYDROCODONE BITARTRATE AND ACETAMINOPHEN 1 TABLET: 5; 325 TABLET ORAL at 07:41

## 2021-05-07 VITALS
TEMPERATURE: 98.3 F | WEIGHT: 182.3 LBS | OXYGEN SATURATION: 99 % | DIASTOLIC BLOOD PRESSURE: 74 MMHG | SYSTOLIC BLOOD PRESSURE: 134 MMHG | HEIGHT: 66 IN | BODY MASS INDEX: 29.3 KG/M2 | HEART RATE: 63 BPM | RESPIRATION RATE: 16 BRPM

## 2021-05-07 PROCEDURE — 0503F POSTPARTUM CARE VISIT: CPT | Performed by: MIDWIFE

## 2021-05-07 RX ORDER — IBUPROFEN 600 MG/1
600 TABLET ORAL EVERY 6 HOURS PRN
Qty: 60 TABLET | Refills: 0 | Status: SHIPPED | OUTPATIENT
Start: 2021-05-07

## 2021-05-07 RX ORDER — FERROUS SULFATE TAB EC 324 MG (65 MG FE EQUIVALENT) 324 (65 FE) MG
324 TABLET DELAYED RESPONSE ORAL 2 TIMES DAILY WITH MEALS
Qty: 60 TABLET | Refills: 0 | Status: SHIPPED | OUTPATIENT
Start: 2021-05-07

## 2021-05-07 RX ADMIN — DOCUSATE SODIUM 100 MG: 100 CAPSULE, LIQUID FILLED ORAL at 08:21

## 2021-05-07 RX ADMIN — IBUPROFEN 600 MG: 600 TABLET ORAL at 05:58

## 2021-05-07 RX ADMIN — PRENATAL VITAMINS-IRON FUMARATE 27 MG IRON-FOLIC ACID 0.8 MG TABLET 1 TABLET: at 08:21

## 2021-05-07 RX ADMIN — FERROUS SULFATE TAB EC 324 MG (65 MG FE EQUIVALENT) 324 MG: 324 (65 FE) TABLET DELAYED RESPONSE at 08:21

## 2023-08-17 ENCOUNTER — HOSPITAL ENCOUNTER (EMERGENCY)
Facility: HOSPITAL | Age: 23
Discharge: HOME | End: 2023-08-18
Attending: EMERGENCY MEDICINE
Payer: MEDICAID

## 2023-08-17 DIAGNOSIS — R11.2 NAUSEA VOMITING AND DIARRHEA: Primary | ICD-10-CM

## 2023-08-17 DIAGNOSIS — R19.7 NAUSEA VOMITING AND DIARRHEA: Primary | ICD-10-CM

## 2023-08-17 LAB
ALBUMIN SERPL-MCNC: 4.9 G/DL (ref 3.5–5.2)
ALBUMIN/GLOB SERPL: 1.8 G/DL
ALP SERPL-CCNC: 80 U/L (ref 39–117)
ALT SERPL W P-5'-P-CCNC: 27 U/L (ref 1–33)
ANION GAP SERPL CALCULATED.3IONS-SCNC: 13.9 MMOL/L (ref 5–15)
AST SERPL-CCNC: 24 U/L (ref 1–32)
BACTERIA UR QL AUTO: ABNORMAL /HPF
BASOPHILS # BLD AUTO: 0.01 10*3/MM3 (ref 0–0.2)
BASOPHILS NFR BLD AUTO: 0.1 % (ref 0–1.5)
BILIRUB SERPL-MCNC: 0.7 MG/DL (ref 0–1.2)
BILIRUB UR QL STRIP: ABNORMAL
BUN SERPL-MCNC: 10 MG/DL (ref 6–20)
BUN/CREAT SERPL: 11.8 (ref 7–25)
CALCIUM SPEC-SCNC: 9.9 MG/DL (ref 8.6–10.5)
CHLORIDE SERPL-SCNC: 103 MMOL/L (ref 98–107)
CLARITY UR: ABNORMAL
CO2 SERPL-SCNC: 22.1 MMOL/L (ref 22–29)
COLOR UR: ABNORMAL
CREAT SERPL-MCNC: 0.85 MG/DL (ref 0.57–1)
DEPRECATED RDW RBC AUTO: 46.6 FL (ref 37–54)
EGFRCR SERPLBLD CKD-EPI 2021: 98.3 ML/MIN/1.73
EOSINOPHIL # BLD AUTO: 0 10*3/MM3 (ref 0–0.4)
EOSINOPHIL NFR BLD AUTO: 0 % (ref 0.3–6.2)
ERYTHROCYTE [DISTWIDTH] IN BLOOD BY AUTOMATED COUNT: 14.3 % (ref 12.3–15.4)
GLOBULIN UR ELPH-MCNC: 2.8 GM/DL
GLUCOSE SERPL-MCNC: 167 MG/DL (ref 65–99)
GLUCOSE UR STRIP-MCNC: NEGATIVE MG/DL
HCT VFR BLD AUTO: 37.9 % (ref 34–46.6)
HGB BLD-MCNC: 12.5 G/DL (ref 12–15.9)
HGB UR QL STRIP.AUTO: ABNORMAL
HOLD SPECIMEN: NORMAL
HOLD SPECIMEN: NORMAL
HYALINE CASTS UR QL AUTO: ABNORMAL /LPF
IMM GRANULOCYTES # BLD AUTO: 0.06 10*3/MM3 (ref 0–0.05)
IMM GRANULOCYTES NFR BLD AUTO: 0.5 % (ref 0–0.5)
KETONES UR QL STRIP: ABNORMAL
LEUKOCYTE ESTERASE UR QL STRIP.AUTO: ABNORMAL
LIPASE SERPL-CCNC: 48 U/L (ref 13–60)
LYMPHOCYTES # BLD AUTO: 1.21 10*3/MM3 (ref 0.7–3.1)
LYMPHOCYTES NFR BLD AUTO: 10.1 % (ref 19.6–45.3)
MCH RBC QN AUTO: 29.6 PG (ref 26.6–33)
MCHC RBC AUTO-ENTMCNC: 33 G/DL (ref 31.5–35.7)
MCV RBC AUTO: 89.6 FL (ref 79–97)
MONOCYTES # BLD AUTO: 0.34 10*3/MM3 (ref 0.1–0.9)
MONOCYTES NFR BLD AUTO: 2.8 % (ref 5–12)
MUCOUS THREADS URNS QL MICRO: ABNORMAL /HPF
NEUTROPHILS NFR BLD AUTO: 10.31 10*3/MM3 (ref 1.7–7)
NEUTROPHILS NFR BLD AUTO: 86.5 % (ref 42.7–76)
NITRITE UR QL STRIP: NEGATIVE
NRBC BLD AUTO-RTO: 0 /100 WBC (ref 0–0.2)
PH UR STRIP.AUTO: 5.5 [PH] (ref 5–8)
PLATELET # BLD AUTO: 342 10*3/MM3 (ref 140–450)
PMV BLD AUTO: 9.2 FL (ref 6–12)
POTASSIUM SERPL-SCNC: 3.9 MMOL/L (ref 3.5–5.2)
PROT SERPL-MCNC: 7.7 G/DL (ref 6–8.5)
PROT UR QL STRIP: ABNORMAL
RBC # BLD AUTO: 4.23 10*6/MM3 (ref 3.77–5.28)
RBC # UR STRIP: ABNORMAL /HPF
REF LAB TEST METHOD: ABNORMAL
SODIUM SERPL-SCNC: 139 MMOL/L (ref 136–145)
SP GR UR STRIP: >=1.03 (ref 1–1.03)
SQUAMOUS #/AREA URNS HPF: ABNORMAL /HPF
UROBILINOGEN UR QL STRIP: ABNORMAL
WBC # UR STRIP: ABNORMAL /HPF
WBC NRBC COR # BLD: 11.93 10*3/MM3 (ref 3.4–10.8)
WHOLE BLOOD HOLD COAG: NORMAL
WHOLE BLOOD HOLD SPECIMEN: NORMAL

## 2023-08-17 PROCEDURE — 81001 URINALYSIS AUTO W/SCOPE: CPT | Performed by: EMERGENCY MEDICINE

## 2023-08-17 PROCEDURE — 25010000002 KETOROLAC TROMETHAMINE PER 15 MG: Performed by: EMERGENCY MEDICINE

## 2023-08-17 PROCEDURE — 83690 ASSAY OF LIPASE: CPT | Performed by: EMERGENCY MEDICINE

## 2023-08-17 PROCEDURE — 99283 EMERGENCY DEPT VISIT LOW MDM: CPT

## 2023-08-17 PROCEDURE — 96375 TX/PRO/DX INJ NEW DRUG ADDON: CPT

## 2023-08-17 PROCEDURE — 25010000002 ONDANSETRON PER 1 MG: Performed by: EMERGENCY MEDICINE

## 2023-08-17 PROCEDURE — 85025 COMPLETE CBC W/AUTO DIFF WBC: CPT

## 2023-08-17 PROCEDURE — 96374 THER/PROPH/DIAG INJ IV PUSH: CPT

## 2023-08-17 PROCEDURE — 80053 COMPREHEN METABOLIC PANEL: CPT | Performed by: EMERGENCY MEDICINE

## 2023-08-17 RX ORDER — ONDANSETRON 2 MG/ML
4 INJECTION INTRAMUSCULAR; INTRAVENOUS ONCE
Status: COMPLETED | OUTPATIENT
Start: 2023-08-17 | End: 2023-08-17

## 2023-08-17 RX ORDER — KETOROLAC TROMETHAMINE 30 MG/ML
15 INJECTION, SOLUTION INTRAMUSCULAR; INTRAVENOUS ONCE
Status: COMPLETED | OUTPATIENT
Start: 2023-08-17 | End: 2023-08-17

## 2023-08-17 RX ORDER — SODIUM CHLORIDE 0.9 % (FLUSH) 0.9 %
10 SYRINGE (ML) INJECTION AS NEEDED
Status: DISCONTINUED | OUTPATIENT
Start: 2023-08-17 | End: 2023-08-18 | Stop reason: HOSPADM

## 2023-08-17 RX ADMIN — SODIUM CHLORIDE 1000 ML: 9 INJECTION, SOLUTION INTRAVENOUS at 22:38

## 2023-08-17 RX ADMIN — ONDANSETRON 4 MG: 2 INJECTION INTRAMUSCULAR; INTRAVENOUS at 22:56

## 2023-08-17 RX ADMIN — KETOROLAC TROMETHAMINE 15 MG: 30 INJECTION, SOLUTION INTRAMUSCULAR; INTRAVENOUS at 22:37

## 2023-08-17 RX ADMIN — ONDANSETRON 4 MG: 2 INJECTION INTRAMUSCULAR; INTRAVENOUS at 22:37

## 2023-08-17 NOTE — Clinical Note
[facility] EMERGENCY DEPARTMENT  [address] [address]  Phone: [phone]    Sharonda Saez was seen and treated in our emergency department on 8/17/2023.  She may return to work on 08/21/2023.         Thank you for choosing [facility].    Artur Moran MD

## 2023-08-18 VITALS
OXYGEN SATURATION: 97 % | WEIGHT: 145 LBS | HEIGHT: 66 IN | BODY MASS INDEX: 23.3 KG/M2 | HEART RATE: 50 BPM | RESPIRATION RATE: 12 BRPM | DIASTOLIC BLOOD PRESSURE: 51 MMHG | TEMPERATURE: 98.5 F | SYSTOLIC BLOOD PRESSURE: 95 MMHG

## 2023-08-18 RX ORDER — ONDANSETRON 4 MG/1
4 TABLET, FILM COATED ORAL EVERY 8 HOURS PRN
Qty: 10 TABLET | Refills: 0 | Status: SHIPPED | OUTPATIENT
Start: 2023-08-18

## 2023-08-18 NOTE — ED PROVIDER NOTES
"  HPI: Sharonda Saez is a 22 y.o. female who presents to the emergency department complaining of \"I think I got a stomach bug\".  Patient states this morning she developed nausea, vomiting and diarrhea.  She is also had some mild upper abdominal discomfort.  No fevers, chest pain, plaints.  No known sick contacts.      REVIEW OF SYSTEMS: All other systems reviewed and are negative     PAST MEDICAL HISTORY:   Past Medical History:   Diagnosis Date    Asthma     Asthma         FAMILY HISTORY:   Family History   Problem Relation Age of Onset    Diabetes Maternal Grandmother     Colon cancer Maternal Grandfather     Lung cancer Maternal Grandfather     Heart disease Maternal Grandfather     Stroke Maternal Grandfather     Stroke Paternal Grandfather         SOCIAL HISTORY:   Social History     Socioeconomic History    Marital status: Single   Tobacco Use    Smoking status: Former     Packs/day: 0.25     Years: 2.00     Pack years: 0.50     Types: Cigarettes     Quit date: 2020     Years since quittin.9    Smokeless tobacco: Never   Vaping Use    Vaping Use: Never used   Substance and Sexual Activity    Alcohol use: Never    Drug use: Not Currently    Sexual activity: Yes     Partners: Male        SURGICAL HISTORY:   History reviewed. No pertinent surgical history.     ALLERGIES: Bee venom       PHYSICAL EXAM:   VITAL SIGNS:   Vitals:    23 2302   BP: 98/46   Pulse: (!) 46   Resp: 14   Temp:    SpO2: 98%      CONSTITUTIONAL: Awake, well appearing, nontoxic   HENT: Atraumatic, normocephalic, oral mucosa moist, airway patent. Nares patent without drainage. External ears normal.   EYES: Conjunctivae clear, EOMI, PERRL   NECK: Trachea midline, nontender, supple   CARDIOVASCULAR: Normal heart rate, Normal rhythm.  PULMONARY/CHEST: Normal work of breathing. Clear to auscultation, no rhonchi, wheezes, or rales.  ABDOMINAL: Nondistended, soft, minimal epigastric tenderness, no rebound or guarding.  NEUROLOGIC: " Nonfocal, moves all four extremities, no gross sensory or motor deficits.   EXTREMITIES: No clubbing, cyanosis, or edema   SKIN: Warm, Dry, No erythema, No rash       ED COURSE / MEDICAL DECISION MAKING:     Shaornda Saez is a 22 y.o. female who presents to the emergency department for evaluation of nausea, vomiting and diarrhea.  Well-developed, well-nourished young lady in no distress with exam as above.  Her vital signs are normal.  Her oxygen saturation is normal on room air at 98%.  Her exam is pretty benign, she has mild epigastric tenderness.  Will obtain basic labs and treat symptomatically.  Based on her history and exam I do not feel imaging is indicated at this point.  Disposition pending.    Differential diagnosis includes gastroenteritis, viral illness, dehydration, gastritis, ulcer among other etiologies.    Labs are nonactionable.  Urinalysis with large blood but patient is on her menstrual cycle.  Patient feels improved, tolerating p.o.  I do feel she is safe for discharge home at this time.  Advised supportive measures and outpatient follow-up.  She is happy with this plan.    Final diagnoses:   Nausea vomiting and diarrhea        Artur Moran MD  08/18/23 0012

## 2023-08-19 ENCOUNTER — HOSPITAL ENCOUNTER (EMERGENCY)
Facility: HOSPITAL | Age: 23
Discharge: HOME | End: 2023-08-19
Attending: EMERGENCY MEDICINE
Payer: MEDICAID

## 2023-08-19 ENCOUNTER — APPOINTMENT (OUTPATIENT)
Dept: CT IMAGING | Facility: HOSPITAL | Age: 23
End: 2023-08-19
Payer: MEDICAID

## 2023-08-19 VITALS
OXYGEN SATURATION: 96 % | WEIGHT: 145 LBS | BODY MASS INDEX: 23.3 KG/M2 | HEIGHT: 66 IN | RESPIRATION RATE: 18 BRPM | HEART RATE: 56 BPM | SYSTOLIC BLOOD PRESSURE: 105 MMHG | TEMPERATURE: 98.1 F | DIASTOLIC BLOOD PRESSURE: 64 MMHG

## 2023-08-19 DIAGNOSIS — R11.2 NAUSEA AND VOMITING, UNSPECIFIED VOMITING TYPE: Primary | ICD-10-CM

## 2023-08-19 DIAGNOSIS — R10.10 UPPER ABDOMINAL PAIN: ICD-10-CM

## 2023-08-19 LAB
ALBUMIN SERPL-MCNC: 4.7 G/DL (ref 3.5–5.2)
ALBUMIN/GLOB SERPL: 2.1 G/DL
ALP SERPL-CCNC: 71 U/L (ref 39–117)
ALT SERPL W P-5'-P-CCNC: 119 U/L (ref 1–33)
ANION GAP SERPL CALCULATED.3IONS-SCNC: 12.4 MMOL/L (ref 5–15)
AST SERPL-CCNC: 110 U/L (ref 1–32)
B-HCG UR QL: NEGATIVE
BACTERIA UR QL AUTO: ABNORMAL /HPF
BASOPHILS # BLD AUTO: 0.02 10*3/MM3 (ref 0–0.2)
BASOPHILS NFR BLD AUTO: 0.2 % (ref 0–1.5)
BILIRUB SERPL-MCNC: 0.9 MG/DL (ref 0–1.2)
BILIRUB UR QL STRIP: ABNORMAL
BUN SERPL-MCNC: 14 MG/DL (ref 6–20)
BUN/CREAT SERPL: 17.9 (ref 7–25)
CALCIUM SPEC-SCNC: 9.4 MG/DL (ref 8.6–10.5)
CHLORIDE SERPL-SCNC: 102 MMOL/L (ref 98–107)
CLARITY UR: CLEAR
CO2 SERPL-SCNC: 24.6 MMOL/L (ref 22–29)
COLOR UR: ABNORMAL
CREAT SERPL-MCNC: 0.78 MG/DL (ref 0.57–1)
DEPRECATED RDW RBC AUTO: 47.6 FL (ref 37–54)
EGFRCR SERPLBLD CKD-EPI 2021: 108.9 ML/MIN/1.73
EOSINOPHIL # BLD AUTO: 0 10*3/MM3 (ref 0–0.4)
EOSINOPHIL NFR BLD AUTO: 0 % (ref 0.3–6.2)
ERYTHROCYTE [DISTWIDTH] IN BLOOD BY AUTOMATED COUNT: 14.3 % (ref 12.3–15.4)
FLUAV RNA RESP QL NAA+PROBE: NOT DETECTED
FLUBV RNA RESP QL NAA+PROBE: NOT DETECTED
GLOBULIN UR ELPH-MCNC: 2.2 GM/DL
GLUCOSE SERPL-MCNC: 125 MG/DL (ref 65–99)
GLUCOSE UR STRIP-MCNC: NEGATIVE MG/DL
HCG SERPL QL: NEGATIVE
HCT VFR BLD AUTO: 36.1 % (ref 34–46.6)
HGB BLD-MCNC: 11.7 G/DL (ref 12–15.9)
HGB UR QL STRIP.AUTO: NEGATIVE
HOLD SPECIMEN: NORMAL
HOLD SPECIMEN: NORMAL
HYALINE CASTS UR QL AUTO: ABNORMAL /LPF
IMM GRANULOCYTES # BLD AUTO: 0.03 10*3/MM3 (ref 0–0.05)
IMM GRANULOCYTES NFR BLD AUTO: 0.3 % (ref 0–0.5)
KETONES UR QL STRIP: ABNORMAL
LEUKOCYTE ESTERASE UR QL STRIP.AUTO: NEGATIVE
LIPASE SERPL-CCNC: 27 U/L (ref 13–60)
LYMPHOCYTES # BLD AUTO: 2.42 10*3/MM3 (ref 0.7–3.1)
LYMPHOCYTES NFR BLD AUTO: 21.5 % (ref 19.6–45.3)
MAGNESIUM SERPL-MCNC: 2.1 MG/DL (ref 1.6–2.6)
MCH RBC QN AUTO: 29.3 PG (ref 26.6–33)
MCHC RBC AUTO-ENTMCNC: 32.4 G/DL (ref 31.5–35.7)
MCV RBC AUTO: 90.3 FL (ref 79–97)
MONOCYTES # BLD AUTO: 0.65 10*3/MM3 (ref 0.1–0.9)
MONOCYTES NFR BLD AUTO: 5.8 % (ref 5–12)
MUCOUS THREADS URNS QL MICRO: ABNORMAL /HPF
NEUTROPHILS NFR BLD AUTO: 72.2 % (ref 42.7–76)
NEUTROPHILS NFR BLD AUTO: 8.13 10*3/MM3 (ref 1.7–7)
NITRITE UR QL STRIP: NEGATIVE
NRBC BLD AUTO-RTO: 0 /100 WBC (ref 0–0.2)
PH UR STRIP.AUTO: 6 [PH] (ref 5–8)
PLATELET # BLD AUTO: 289 10*3/MM3 (ref 140–450)
PMV BLD AUTO: 9.1 FL (ref 6–12)
POTASSIUM SERPL-SCNC: 3.5 MMOL/L (ref 3.5–5.2)
PROT SERPL-MCNC: 6.9 G/DL (ref 6–8.5)
PROT UR QL STRIP: ABNORMAL
RBC # BLD AUTO: 4 10*6/MM3 (ref 3.77–5.28)
RBC # UR STRIP: ABNORMAL /HPF
REF LAB TEST METHOD: ABNORMAL
SARS-COV-2 RNA RESP QL NAA+PROBE: NOT DETECTED
SODIUM SERPL-SCNC: 139 MMOL/L (ref 136–145)
SP GR UR STRIP: >=1.03 (ref 1–1.03)
SQUAMOUS #/AREA URNS HPF: ABNORMAL /HPF
UROBILINOGEN UR QL STRIP: ABNORMAL
WBC # UR STRIP: ABNORMAL /HPF
WBC NRBC COR # BLD: 11.25 10*3/MM3 (ref 3.4–10.8)
WHOLE BLOOD HOLD COAG: NORMAL
WHOLE BLOOD HOLD SPECIMEN: NORMAL

## 2023-08-19 PROCEDURE — 99285 EMERGENCY DEPT VISIT HI MDM: CPT

## 2023-08-19 PROCEDURE — 83735 ASSAY OF MAGNESIUM: CPT | Performed by: PHYSICIAN ASSISTANT

## 2023-08-19 PROCEDURE — 25010000002 ONDANSETRON PER 1 MG: Performed by: PHYSICIAN ASSISTANT

## 2023-08-19 PROCEDURE — 25510000001 IOPAMIDOL 61 % SOLUTION: Performed by: EMERGENCY MEDICINE

## 2023-08-19 PROCEDURE — 87636 SARSCOV2 & INF A&B AMP PRB: CPT | Performed by: PHYSICIAN ASSISTANT

## 2023-08-19 PROCEDURE — 80053 COMPREHEN METABOLIC PANEL: CPT

## 2023-08-19 PROCEDURE — 74177 CT ABD & PELVIS W/CONTRAST: CPT

## 2023-08-19 PROCEDURE — 81025 URINE PREGNANCY TEST: CPT | Performed by: PHYSICIAN ASSISTANT

## 2023-08-19 PROCEDURE — 84703 CHORIONIC GONADOTROPIN ASSAY: CPT | Performed by: PHYSICIAN ASSISTANT

## 2023-08-19 PROCEDURE — 25010000002 DIPHENHYDRAMINE PER 50 MG: Performed by: PHYSICIAN ASSISTANT

## 2023-08-19 PROCEDURE — 83690 ASSAY OF LIPASE: CPT

## 2023-08-19 PROCEDURE — 93005 ELECTROCARDIOGRAM TRACING: CPT | Performed by: PHYSICIAN ASSISTANT

## 2023-08-19 PROCEDURE — 96374 THER/PROPH/DIAG INJ IV PUSH: CPT

## 2023-08-19 PROCEDURE — 25010000002 METOCLOPRAMIDE PER 10 MG: Performed by: PHYSICIAN ASSISTANT

## 2023-08-19 PROCEDURE — 85025 COMPLETE CBC W/AUTO DIFF WBC: CPT

## 2023-08-19 PROCEDURE — 96375 TX/PRO/DX INJ NEW DRUG ADDON: CPT

## 2023-08-19 PROCEDURE — 81001 URINALYSIS AUTO W/SCOPE: CPT | Performed by: PHYSICIAN ASSISTANT

## 2023-08-19 PROCEDURE — P9612 CATHETERIZE FOR URINE SPEC: HCPCS

## 2023-08-19 RX ORDER — PROMETHAZINE HYDROCHLORIDE 12.5 MG/1
12.5 TABLET ORAL EVERY 6 HOURS PRN
Qty: 10 TABLET | Refills: 0 | Status: SHIPPED | OUTPATIENT
Start: 2023-08-19

## 2023-08-19 RX ORDER — ONDANSETRON 2 MG/ML
4 INJECTION INTRAMUSCULAR; INTRAVENOUS ONCE
Status: COMPLETED | OUTPATIENT
Start: 2023-08-19 | End: 2023-08-19

## 2023-08-19 RX ORDER — SODIUM CHLORIDE 0.9 % (FLUSH) 0.9 %
10 SYRINGE (ML) INJECTION AS NEEDED
Status: DISCONTINUED | OUTPATIENT
Start: 2023-08-19 | End: 2023-08-19 | Stop reason: HOSPADM

## 2023-08-19 RX ORDER — METOCLOPRAMIDE HYDROCHLORIDE 5 MG/ML
10 INJECTION INTRAMUSCULAR; INTRAVENOUS ONCE
Status: COMPLETED | OUTPATIENT
Start: 2023-08-19 | End: 2023-08-19

## 2023-08-19 RX ORDER — DIPHENHYDRAMINE HYDROCHLORIDE 50 MG/ML
25 INJECTION INTRAMUSCULAR; INTRAVENOUS ONCE
Status: COMPLETED | OUTPATIENT
Start: 2023-08-19 | End: 2023-08-19

## 2023-08-19 RX ADMIN — METOCLOPRAMIDE 10 MG: 5 INJECTION, SOLUTION INTRAMUSCULAR; INTRAVENOUS at 17:28

## 2023-08-19 RX ADMIN — DIPHENHYDRAMINE HYDROCHLORIDE 25 MG: 50 INJECTION INTRAMUSCULAR; INTRAVENOUS at 17:28

## 2023-08-19 RX ADMIN — ONDANSETRON 4 MG: 2 INJECTION INTRAMUSCULAR; INTRAVENOUS at 19:09

## 2023-08-19 RX ADMIN — IOPAMIDOL 90 ML: 612 INJECTION, SOLUTION INTRAVENOUS at 19:25

## 2023-08-19 RX ADMIN — SODIUM CHLORIDE 1000 ML: 9 INJECTION, SOLUTION INTRAVENOUS at 17:28

## 2023-08-19 NOTE — ED NOTES
Pt unable to provide urine sample at this time, pt given cup for collection and educated on how to collect sample.

## 2023-08-19 NOTE — ED PROVIDER NOTES
Subjective  History of Present Illness:    Chief Complaint: Vomiting  History of Present Illness: Patient is a 22-year-old female history of asthma presenting to the ER for evaluation of vomiting and abdominal pain.  Patient was seen here few days ago and thought she may had had a stomach bug.  She was evaluated here, did not see any imaging performed.  She presented with Zofran which she states is not helping.  She states she has continued to have emesis even after taking his medications.  She states she cannot hold anything down.  She reports increased pain in her abdomen.  She states is more in the upper abdomen and feels like a squeezing pain that comes in waves.  She states she initially had diarrhea but this has resolved, denies any hematemesis, melena or hematochezia.  She denies any fever, chills, dizziness, syncope, difficulty breathing, productive cough, dysuria, hematuria, or any other symptoms.  She denies any undercooked foods, recent sick contacts or travel.  She does report that she has had a cholecystectomy in the past.  Onset: Few days   Duration: Persistent  Exacerbating / Alleviating factors: None  Associated symptoms: None      Nurses Notes reviewed and agree, including vitals, allergies, social history and prior medical history.     REVIEW OF SYSTEMS: All systems reviewed and not pertinent unless noted.  Review of Systems      Positive for: Abdominal pain, vomiting    Negative for: Fever, chills, dizziness, syncope, chest pain, cough, dysuria, hematuria    Past Medical History:   Diagnosis Date    Asthma     Asthma        Allergies:    Bee venom      History reviewed. No pertinent surgical history.      Social History     Socioeconomic History    Marital status: Single   Tobacco Use    Smoking status: Former     Packs/day: 0.25     Years: 2.00     Pack years: 0.50     Types: Cigarettes     Quit date: 2020     Years since quittin.9    Smokeless tobacco: Never   Vaping Use    Vaping Use:  "Never used   Substance and Sexual Activity    Alcohol use: Never    Drug use: Not Currently    Sexual activity: Yes     Partners: Male         Family History   Problem Relation Age of Onset    Diabetes Maternal Grandmother     Colon cancer Maternal Grandfather     Lung cancer Maternal Grandfather     Heart disease Maternal Grandfather     Stroke Maternal Grandfather     Stroke Paternal Grandfather        Objective  Physical Exam:  /64   Pulse 56   Temp 98.1 øF (36.7 øC) (Oral)   Resp 18   Ht 167.6 cm (66\")   Wt 65.8 kg (145 lb)   LMP 08/17/2023   SpO2 96%   Breastfeeding No   BMI 23.40 kg/mý      Physical Exam    CONSTITUTIONAL: Well developed,  acute distress, nontoxic in appearance.  She is awake, alert, speaking in full sentences.  VITAL SIGNS: per nursing, reviewed and noted  SKIN: exposed skin with no rashes, ulcerations or petechiae  EYES: Grossly EOMI, no icterus, PERRL  ENT: Normal voice.  Nares patent, no facial asymmetry  RESPIRATORY:  No increased work of breathing. No retractions.  Lung sounds are clear bilaterally  CARDIOVASCULAR: Bradycardic, regular rhythm, distal pulses intact  GI: Abdomen soft, tender to palpation in the epigastric region without significant guarding  MUSCULOSKELETAL:  No tenderness. Full ROM. Strength and tone grossly normal.  no spasms.   NEUROLOGIC: Alert, oriented x 3. No gross deficits. GCS 15.   PSYCH: appropriate affect.      Procedures    ED Course:        ED Course as of 08/19/23 2210   Sat Aug 19, 2023   1813 COVID19: Not Detected [LR]   1813 Influenza A PCR: Not Detected [LR]   1813 Influenza B PCR: Not Detected [LR]   1821 EKG interpreted by me reveals sinus rhythm with a rate of 50 bpm.  There are no obvious acute ischemic findings.  There is a short [place] interval.  This is an atypical appearing EKG. [TB]   1901 HCG Qualitative: Negative [LR]   1914 HCG, Urine QL: Negative [LR]   1914 Color, UA(!): Dark Yellow [LR]   1914 Appearance, UA: Clear [LR]   1914 " pH, UA: 6.0 [LR]   1914 Specific Gravity, UA: >=1.030 [LR]   1914 Glucose: Negative [LR]   1914 Ketones, UA(!): Trace [LR]   1914 Bilirubin, UA(!): Moderate (2+) [LR]   1914 Blood, UA: Negative [LR]   1914 Protein, UA(!): 30 mg/dL (1+) [LR]   1914 Leukocytes, UA: Negative [LR]   1914 Nitrite, UA: Negative [LR]   1914 Urobilinogen, UA(!): 2.0 E.U./dL [LR]   1943 RBC, UA: None Seen [LR]   1943 WBC, UA: None Seen [LR]   1943 Bacteria, UA(!): Trace [LR]   1943 Squamous Epithelial Cells, UA(!): 3-6 [LR]   1943 Hyaline Casts, UA: None Seen [LR]   1943 Mucus, UA(!): Moderate/2+ [LR]   1943 Methodology:: Manual Light Microscopy [LR]   1943 Urine appears contaminated with squamous cells, no obvious urinary tract infection [LR]   2002 Epigastric pain, vomiting     FINDINGS:  LOWER CHEST: The heart is normal size.  The lung bases are  clear.  ABDOMEN/PELVIS:  Liver, gallbladder and bile ducts: The  liver enhances homogeneously without suspicious focal hepatic  lesion.  There has been a prior cholecystectomy.  There is no  definite biliary duct dilatation.  Adrenal glands: The adrenal  glands are morphologically unremarkable without suspicious  lesion.  Kidneys, ureter and urinary bladder: No suspicious  renal lesion.  No hydronephrosis.  Urinary bladder is  unremarkable.  Spleen: The spleen is normal size.  Pancreas: The  pancreas is grossly unremarkable.  GI systems and mesentery: No  evidence of bowel obstruction.  The appendix is visualized and  unremarkable in appearance.  No significant mesenteric  inflammation.  Lymph nodes: No definite pathologically enlarged  abdominal or pelvic lymph nodes present.  Vessels: The aorta and  abdominal arteries are grossly patent.  The IVC and portal vein  are patent and grossly unremarkable.  Peritoneum: No free  intraperitoneal fluid or pneumoperitoneum.  Pelvic viscera: No  acute findings.  Body wall: No body wall contusion. No  significant body wall hernias.  Bones: No acute  fracture.     IMPRESSION:  No acute findings in the abdomen or pelvis to account for  patient's symptoms.     Authenticated and Electronically Signed by Indio Zuleta MD on  08/19/2023 08:01:41 PM   [LR]   2006 Patient has not vomited here.  Discussed findings of her work-up.  We will call in Phenergan to use, discussed that she may need additional outpatient work-up and evaluation.  Discussed very strict return precautions to the ER.  She verbalized understanding and was in agreement with this plan of care. [LR]      ED Course User Index  [LR] Josie Ruff PA-C  [TB] Zahraa Shirley MD       Lab Results (last 24 hours)       Procedure Component Value Units Date/Time    CBC & Differential [[id number]]  (Abnormal) Collected: 08/19/23 1623    Specimen: Blood Updated: 08/19/23 1632    Narrative:      The following orders were created for panel order CBC & Differential.  Procedure                               Abnormality         Status                     ---------                               -----------         ------                     CBC Auto Differential[[id number]]        Abnormal            Final result                 Please view results for these tests on the individual orders.    Comprehensive Metabolic Panel [[id number]]  (Abnormal) Collected: 08/19/23 1623    Specimen: Blood Updated: 08/19/23 1654     Glucose 125 mg/dL      BUN 14 mg/dL      Creatinine 0.78 mg/dL      Sodium 139 mmol/L      Potassium 3.5 mmol/L      Chloride 102 mmol/L      CO2 24.6 mmol/L      Calcium 9.4 mg/dL      Total Protein 6.9 g/dL      Albumin 4.7 g/dL      ALT (SGPT) 119 U/L      AST (SGOT) 110 U/L      Alkaline Phosphatase 71 U/L      Total Bilirubin 0.9 mg/dL      Globulin 2.2 gm/dL      A/G Ratio 2.1 g/dL      BUN/Creatinine Ratio 17.9     Anion Gap 12.4 mmol/L      eGFR 108.9 mL/min/1.73     Narrative:      GFR Normal >60  Chronic Kidney Disease <60  Kidney Failure <15      Lipase [[id number]]  (Normal) Collected:  08/19/23 1623    Specimen: Blood Updated: 08/19/23 1654     Lipase 27 U/L     CBC Auto Differential [[id number]]  (Abnormal) Collected: 08/19/23 1623    Specimen: Blood Updated: 08/19/23 1632     WBC 11.25 10*3/mm3      RBC 4.00 10*6/mm3      Hemoglobin 11.7 g/dL      Hematocrit 36.1 %      MCV 90.3 fL      MCH 29.3 pg      MCHC 32.4 g/dL      RDW 14.3 %      RDW-SD 47.6 fl      MPV 9.1 fL      Platelets 289 10*3/mm3      Neutrophil % 72.2 %      Lymphocyte % 21.5 %      Monocyte % 5.8 %      Eosinophil % 0.0 %      Basophil % 0.2 %      Immature Grans % 0.3 %      Neutrophils, Absolute 8.13 10*3/mm3      Lymphocytes, Absolute 2.42 10*3/mm3      Monocytes, Absolute 0.65 10*3/mm3      Eosinophils, Absolute 0.00 10*3/mm3      Basophils, Absolute 0.02 10*3/mm3      Immature Grans, Absolute 0.03 10*3/mm3      nRBC 0.0 /100 WBC     Magnesium [[id number]]  (Normal) Collected: 08/19/23 1623    Specimen: Blood Updated: 08/19/23 1717     Magnesium 2.1 mg/dL     hCG, Serum, Qualitative [[id number]]  (Normal) Collected: 08/19/23 1623    Specimen: Blood Updated: 08/19/23 1852     HCG Qualitative Negative    COVID-19 and FLU A/B PCR - Swab, Nasopharynx [[id number]]  (Normal) Collected: 08/19/23 1731    Specimen: Swab from Nasopharynx Updated: 08/19/23 1757     COVID19 Not Detected     Influenza A PCR Not Detected     Influenza B PCR Not Detected    Narrative:      Fact sheet for providers: https://www.fda.gov/media/535795/download    Fact sheet for patients: https://www.fda.gov/media/948532/download    Test performed by PCR.    Pregnancy, Urine - Straight Cath [[id number]]  (Normal) Collected: 08/19/23 1859    Specimen: Urine from Straight Cath Updated: 08/19/23 1910     HCG, Urine QL Negative    Urinalysis With Microscopic If Indicated (No Culture) - Straight Cath [[id number]]  (Abnormal) Collected: 08/19/23 1859    Specimen: Urine from Straight Cath Updated: 08/19/23 1910     Color, UA Dark Yellow     Appearance, UA Clear     pH, UA  6.0     Specific Gravity, UA >=1.030     Glucose, UA Negative     Ketones, UA Trace     Bilirubin, UA Moderate (2+)     Blood, UA Negative     Protein, UA 30 mg/dL (1+)     Leuk Esterase, UA Negative     Nitrite, UA Negative     Urobilinogen, UA 2.0 E.U./dL    Urinalysis, Microscopic Only - Straight Cath [[id number]]  (Abnormal) Collected: 08/19/23 1859    Specimen: Urine from Straight Cath Updated: 08/19/23 1917     RBC, UA None Seen /HPF      WBC, UA None Seen /HPF      Bacteria, UA Trace /HPF      Squamous Epithelial Cells, UA 3-6 /HPF      Hyaline Casts, UA None Seen /LPF      Mucus, UA Moderate/2+ /HPF      Methodology Manual Light Microscopy             CT Abdomen Pelvis With Contrast    Result Date: 8/19/2023  FINAL REPORT TECHNIQUE: Axial CT images were performed from the lung bases through the symphysis pubis after the administration of intravenous contrast.  This study was performed with techniques to keep radiation doses as low as reasonably achievable (ALARA). Individualized dose reduction techniques using automated exposure control or adjustment of mA and/or kV according to the patient's size were employed. CLINICAL HISTORY: Epigastric pain, vomiting FINDINGS: LOWER CHEST: The heart is normal size.  The lung bases are clear.  ABDOMEN/PELVIS:  Liver, gallbladder and bile ducts: The liver enhances homogeneously without suspicious focal hepatic lesion.  There has been a prior cholecystectomy.  There is no definite biliary duct dilatation.  Adrenal glands: The adrenal glands are morphologically unremarkable without suspicious lesion.  Kidneys, ureter and urinary bladder: No suspicious renal lesion.  No hydronephrosis.  Urinary bladder is unremarkable.  Spleen: The spleen is normal size.  Pancreas: The pancreas is grossly unremarkable.  GI systems and mesentery: No evidence of bowel obstruction.  The appendix is visualized and unremarkable in appearance.  No significant mesenteric inflammation.  Lymph nodes:  No definite pathologically enlarged abdominal or pelvic lymph nodes present.  Vessels: The aorta and abdominal arteries are grossly patent.  The IVC and portal vein are patent and grossly unremarkable.  Peritoneum: No free intraperitoneal fluid or pneumoperitoneum.  Pelvic viscera: No acute findings.  Body wall: No body wall contusion. No significant body wall hernias.  Bones: No acute fracture.     Impression: No acute findings in the abdomen or pelvis to account for patient's symptoms. Authenticated and Electronically Signed by Indio Zuleta MD on 08/19/2023 08:01:41 PM        MDM     Amount and/or Complexity of Data Reviewed  Clinical lab tests: reviewed  Tests in the radiology section of [facility]: reviewed  Tests in the medicine section of [facility]: reviewed  Decide to obtain previous medical records or to obtain history from someone other than the patient: yes        Initial impression of presenting illness: Patient is a 22-year-old female presenting to the ER for evaluation of vomiting and abdominal pain    DDX: includes but is not limited to: Viral illness, gastroenteritis, peptic ulcer disease, pancreatitis, colitis, dehydration, electro abnormalities, and other concerns.  Patient states she has had a cholecystectomy    Patient arrives in overall stable condition with vitals interpreted by myself.     Pertinent features from physical exam: Bradycardic, afebrile, tender in the epigastric region    Initial diagnostic plan: We will obtain basic labs, lipase, urinalysis, UPT, magnesium.  We will give IV fluids, Reglan and Benadryl.  Will likely obtain CT imaging since she has not had that performed    Results from initial plan were reviewed and interpreted by me revealing overall stable work-up.  Patient had leukocytosis of 11.25, hemoglobin of 11.7.  She had elevated AST of 110 and ALT of 119 with a normal bilirubin of 0.9.  Glucose was 125, no other significant electrolyte abnormalities were noted.  Lipase was not  elevated, magnesium was not elevated.  Urine pregnancy test was negative.  COVID influenza negative.  Urine had some protein and bilirubin with no signs of infection.  CT was read by radiologist without acute findings.    Diagnostic information from other sources: Chart review    Interventions / Re-evaluation: Patient remained stable throughout visit here.  She was given Reglan and Benadryl as well as Zofran and IV fluids    Results/clinical rationale were discussed with patient.  Discussed that she will need very close follow-up as an outpatient.  We will call in Phenergan for her to use since she reports Zofran is not working.  Discussed that she may need further outpatient imaging or lab work.  Discussed rest return precautions to the ER.  She verbalized understanding and was in agreement with this plan of care.    Consultations/Discussion of results with other physicians: None    Disposition plan: Discharge  -----    Final diagnoses:   Nausea and vomiting, unspecified vomiting type   Upper abdominal pain          Josie Ruff PA-C  08/19/23 9724

## 2023-08-20 NOTE — DISCHARGE INSTRUCTIONS
Your liver enzymes and white blood cell count were a bit elevated here today but otherwise your labs are stable.  CT did not show any infectious or obstructive etiology.  Continue bland diet for the next few days.  You can try Phenergan to help with nausea and vomiting.  Drink plenty fluids to stay well-hydrated.  Try to follow-up with primary care provider in the next 24 to 48 hours to reevaluate symptoms and ensure you are improving.  You can contact patient connection to establish care.  They may need to obtain additional work-up, images, labs or other evaluation.  Return to the ER for any change, worsening of symptoms, or any additional concerns including but not limited to intractable vomiting, fever greater than 100.4, dizziness, syncope.

## 2023-10-28 ENCOUNTER — APPOINTMENT (OUTPATIENT)
Dept: GENERAL RADIOLOGY | Facility: HOSPITAL | Age: 23
End: 2023-10-28
Payer: MEDICAID

## 2023-10-28 ENCOUNTER — HOSPITAL ENCOUNTER (EMERGENCY)
Facility: HOSPITAL | Age: 23
Discharge: LEFT BEFORE DISCHARGE | End: 2023-10-28
Attending: EMERGENCY MEDICINE
Payer: MEDICAID

## 2023-10-28 VITALS
HEIGHT: 66 IN | HEART RATE: 68 BPM | RESPIRATION RATE: 16 BRPM | TEMPERATURE: 98.1 F | BODY MASS INDEX: 21.69 KG/M2 | SYSTOLIC BLOOD PRESSURE: 102 MMHG | DIASTOLIC BLOOD PRESSURE: 56 MMHG | OXYGEN SATURATION: 97 % | WEIGHT: 135 LBS

## 2023-10-28 DIAGNOSIS — R11.2 NAUSEA AND VOMITING, UNSPECIFIED VOMITING TYPE: Primary | ICD-10-CM

## 2023-10-28 DIAGNOSIS — R10.10 UPPER ABDOMINAL PAIN: ICD-10-CM

## 2023-10-28 DIAGNOSIS — R07.89 CHEST TIGHTNESS: ICD-10-CM

## 2023-10-28 LAB
ALBUMIN SERPL-MCNC: 5 G/DL (ref 3.5–5.2)
ALBUMIN/GLOB SERPL: 1.9 G/DL
ALP SERPL-CCNC: 86 U/L (ref 39–117)
ALT SERPL W P-5'-P-CCNC: 57 U/L (ref 1–33)
AMPHET+METHAMPHET UR QL: NEGATIVE
AMPHETAMINES UR QL: NEGATIVE
ANION GAP SERPL CALCULATED.3IONS-SCNC: 15.8 MMOL/L (ref 5–15)
AST SERPL-CCNC: 29 U/L (ref 1–32)
B-HCG UR QL: NEGATIVE
BACTERIA UR QL AUTO: ABNORMAL /HPF
BARBITURATES UR QL SCN: NEGATIVE
BASOPHILS # BLD AUTO: 0.02 10*3/MM3 (ref 0–0.2)
BASOPHILS NFR BLD AUTO: 0.2 % (ref 0–1.5)
BENZODIAZ UR QL SCN: NEGATIVE
BILIRUB SERPL-MCNC: 1.6 MG/DL (ref 0–1.2)
BILIRUB UR QL STRIP: NEGATIVE
BUN SERPL-MCNC: 17 MG/DL (ref 6–20)
BUN/CREAT SERPL: 21 (ref 7–25)
BUPRENORPHINE SERPL-MCNC: NEGATIVE NG/ML
CALCIUM SPEC-SCNC: 9.6 MG/DL (ref 8.6–10.5)
CANNABINOIDS SERPL QL: POSITIVE
CHLORIDE SERPL-SCNC: 93 MMOL/L (ref 98–107)
CLARITY UR: ABNORMAL
CO2 SERPL-SCNC: 26.2 MMOL/L (ref 22–29)
COCAINE UR QL: NEGATIVE
COLOR UR: ABNORMAL
CREAT SERPL-MCNC: 0.81 MG/DL (ref 0.57–1)
DEPRECATED RDW RBC AUTO: 41.9 FL (ref 37–54)
EGFRCR SERPLBLD CKD-EPI 2021: 103.5 ML/MIN/1.73
EOSINOPHIL # BLD AUTO: 0.01 10*3/MM3 (ref 0–0.4)
EOSINOPHIL NFR BLD AUTO: 0.1 % (ref 0.3–6.2)
ERYTHROCYTE [DISTWIDTH] IN BLOOD BY AUTOMATED COUNT: 13.2 % (ref 12.3–15.4)
FENTANYL UR-MCNC: NEGATIVE NG/ML
FLUAV SUBTYP SPEC NAA+PROBE: NOT DETECTED
FLUBV RNA ISLT QL NAA+PROBE: NOT DETECTED
GLOBULIN UR ELPH-MCNC: 2.7 GM/DL
GLUCOSE SERPL-MCNC: 97 MG/DL (ref 65–99)
GLUCOSE UR STRIP-MCNC: NEGATIVE MG/DL
HCT VFR BLD AUTO: 41.5 % (ref 34–46.6)
HGB BLD-MCNC: 14.5 G/DL (ref 12–15.9)
HGB UR QL STRIP.AUTO: ABNORMAL
HOLD SPECIMEN: NORMAL
HOLD SPECIMEN: NORMAL
HYALINE CASTS UR QL AUTO: ABNORMAL /LPF
IMM GRANULOCYTES # BLD AUTO: 0.03 10*3/MM3 (ref 0–0.05)
IMM GRANULOCYTES NFR BLD AUTO: 0.4 % (ref 0–0.5)
KETONES UR QL STRIP: ABNORMAL
LEUKOCYTE ESTERASE UR QL STRIP.AUTO: ABNORMAL
LIPASE SERPL-CCNC: 30 U/L (ref 13–60)
LYMPHOCYTES # BLD AUTO: 1.54 10*3/MM3 (ref 0.7–3.1)
LYMPHOCYTES NFR BLD AUTO: 18.2 % (ref 19.6–45.3)
MCH RBC QN AUTO: 30.6 PG (ref 26.6–33)
MCHC RBC AUTO-ENTMCNC: 34.9 G/DL (ref 31.5–35.7)
MCV RBC AUTO: 87.6 FL (ref 79–97)
METHADONE UR QL SCN: NEGATIVE
MONOCYTES # BLD AUTO: 0.56 10*3/MM3 (ref 0.1–0.9)
MONOCYTES NFR BLD AUTO: 6.6 % (ref 5–12)
NEUTROPHILS NFR BLD AUTO: 6.31 10*3/MM3 (ref 1.7–7)
NEUTROPHILS NFR BLD AUTO: 74.5 % (ref 42.7–76)
NITRITE UR QL STRIP: NEGATIVE
NRBC BLD AUTO-RTO: 0 /100 WBC (ref 0–0.2)
OPIATES UR QL: NEGATIVE
OXYCODONE UR QL SCN: NEGATIVE
PCP UR QL SCN: NEGATIVE
PH UR STRIP.AUTO: 5.5 [PH] (ref 5–8)
PLATELET # BLD AUTO: 331 10*3/MM3 (ref 140–450)
PMV BLD AUTO: 8.8 FL (ref 6–12)
POTASSIUM SERPL-SCNC: 3.6 MMOL/L (ref 3.5–5.2)
PROT SERPL-MCNC: 7.7 G/DL (ref 6–8.5)
PROT UR QL STRIP: ABNORMAL
RBC # BLD AUTO: 4.74 10*6/MM3 (ref 3.77–5.28)
RBC # UR STRIP: ABNORMAL /HPF
REF LAB TEST METHOD: ABNORMAL
SARS-COV-2 RNA RESP QL NAA+PROBE: NOT DETECTED
SODIUM SERPL-SCNC: 135 MMOL/L (ref 136–145)
SP GR UR STRIP: >=1.03 (ref 1–1.03)
SQUAMOUS #/AREA URNS HPF: ABNORMAL /HPF
TRICYCLICS UR QL SCN: NEGATIVE
TROPONIN T SERPL HS-MCNC: <6 NG/L
UROBILINOGEN UR QL STRIP: ABNORMAL
WBC # UR STRIP: ABNORMAL /HPF
WBC NRBC COR # BLD: 8.47 10*3/MM3 (ref 3.4–10.8)
WHOLE BLOOD HOLD COAG: NORMAL
WHOLE BLOOD HOLD SPECIMEN: NORMAL

## 2023-10-28 PROCEDURE — 81025 URINE PREGNANCY TEST: CPT | Performed by: PHYSICIAN ASSISTANT

## 2023-10-28 PROCEDURE — 96375 TX/PRO/DX INJ NEW DRUG ADDON: CPT

## 2023-10-28 PROCEDURE — 71045 X-RAY EXAM CHEST 1 VIEW: CPT

## 2023-10-28 PROCEDURE — 25810000003 SODIUM CHLORIDE 0.9 % SOLUTION: Performed by: PHYSICIAN ASSISTANT

## 2023-10-28 PROCEDURE — 85025 COMPLETE CBC W/AUTO DIFF WBC: CPT

## 2023-10-28 PROCEDURE — 83690 ASSAY OF LIPASE: CPT

## 2023-10-28 PROCEDURE — 25010000002 DIPHENHYDRAMINE PER 50 MG: Performed by: PHYSICIAN ASSISTANT

## 2023-10-28 PROCEDURE — 80307 DRUG TEST PRSMV CHEM ANLYZR: CPT | Performed by: PHYSICIAN ASSISTANT

## 2023-10-28 PROCEDURE — 84484 ASSAY OF TROPONIN QUANT: CPT | Performed by: PHYSICIAN ASSISTANT

## 2023-10-28 PROCEDURE — 93005 ELECTROCARDIOGRAM TRACING: CPT | Performed by: PHYSICIAN ASSISTANT

## 2023-10-28 PROCEDURE — 87636 SARSCOV2 & INF A&B AMP PRB: CPT | Performed by: PHYSICIAN ASSISTANT

## 2023-10-28 PROCEDURE — 25010000002 DROPERIDOL PER 5 MG: Performed by: PHYSICIAN ASSISTANT

## 2023-10-28 PROCEDURE — 25010000002 ONDANSETRON PER 1 MG: Performed by: PHYSICIAN ASSISTANT

## 2023-10-28 PROCEDURE — 80053 COMPREHEN METABOLIC PANEL: CPT

## 2023-10-28 PROCEDURE — 99284 EMERGENCY DEPT VISIT MOD MDM: CPT

## 2023-10-28 PROCEDURE — 81001 URINALYSIS AUTO W/SCOPE: CPT

## 2023-10-28 PROCEDURE — 96374 THER/PROPH/DIAG INJ IV PUSH: CPT

## 2023-10-28 RX ORDER — SODIUM CHLORIDE 0.9 % (FLUSH) 0.9 %
10 SYRINGE (ML) INJECTION AS NEEDED
Status: DISCONTINUED | OUTPATIENT
Start: 2023-10-28 | End: 2023-10-28 | Stop reason: HOSPADM

## 2023-10-28 RX ORDER — DROPERIDOL 2.5 MG/ML
1.25 INJECTION, SOLUTION INTRAMUSCULAR; INTRAVENOUS ONCE
Status: COMPLETED | OUTPATIENT
Start: 2023-10-28 | End: 2023-10-28

## 2023-10-28 RX ORDER — ONDANSETRON 2 MG/ML
4 INJECTION INTRAMUSCULAR; INTRAVENOUS ONCE
Status: COMPLETED | OUTPATIENT
Start: 2023-10-28 | End: 2023-10-28

## 2023-10-28 RX ORDER — DIPHENHYDRAMINE HYDROCHLORIDE 50 MG/ML
25 INJECTION INTRAMUSCULAR; INTRAVENOUS ONCE
Status: COMPLETED | OUTPATIENT
Start: 2023-10-28 | End: 2023-10-28

## 2023-10-28 RX ADMIN — DROPERIDOL 1.25 MG: 2.5 INJECTION, SOLUTION INTRAMUSCULAR; INTRAVENOUS at 12:43

## 2023-10-28 RX ADMIN — ONDANSETRON 4 MG: 2 INJECTION INTRAMUSCULAR; INTRAVENOUS at 11:32

## 2023-10-28 RX ADMIN — DIPHENHYDRAMINE HYDROCHLORIDE 25 MG: 50 INJECTION INTRAMUSCULAR; INTRAVENOUS at 12:42

## 2023-10-28 RX ADMIN — SODIUM CHLORIDE 500 ML: 9 INJECTION, SOLUTION INTRAVENOUS at 12:43

## 2023-10-28 RX ADMIN — SODIUM CHLORIDE 1000 ML: 9 INJECTION, SOLUTION INTRAVENOUS at 11:33

## 2023-10-28 RX ADMIN — ALUMINUM HYDROXIDE, MAGNESIUM HYDROXIDE, AND DIMETHICONE: 400; 400; 40 SUSPENSION ORAL at 11:32

## 2023-10-28 NOTE — Clinical Note
[facility] EMERGENCY DEPARTMENT  [address] [address]  Phone: [phone]    Sharonda Saez was seen and treated in our emergency department on 10/28/2023.  She may return to work on 10/29/2023.         Thank you for choosing [facility].    Gary Guzman, DO

## 2023-10-28 NOTE — ED PROVIDER NOTES
Subjective  History of Present Illness:    Chief Complaint: Vomiting   History of Present Illness: Patient is a 23-year-old female with history of asthma presenting to the ER for evaluation of vomiting abdominal pain and chest tightness.  She states her symptoms started about 4 days ago.  She states that she has not been able to keep anything down for 4 days.  She states it feels like a squeezing pain in her upper abdomen and there is tightness in her chest.  She denies any known fever, diarrhea, dysuria, hematuria.  She denies any known recent sick contacts.  Unsure of fever.  She is currently on her menstrual cycle.  Onset: 4 days  Duration: Persistent  Exacerbating / Alleviating factors: None  Associated symptoms: None      Nurses Notes reviewed and agree, including vitals, allergies, social history and prior medical history.     REVIEW OF SYSTEMS: All systems reviewed and not pertinent unless noted.  Review of Systems      Positive for: Nausea, vomiting, chest tightness, abdominal cramping    Negative for: Fever, chills, syncope, shortness of breath, dysuria, hematuria    Past Medical History:   Diagnosis Date    Asthma     Asthma        Allergies:    Bee venom      History reviewed. No pertinent surgical history.      Social History     Socioeconomic History    Marital status: Single   Tobacco Use    Smoking status: Former     Packs/day: 0.25     Years: 2.00     Additional pack years: 0.00     Total pack years: 0.50     Types: Cigarettes     Quit date: 9/17/2020     Years since quitting: 3.1    Smokeless tobacco: Never   Vaping Use    Vaping Use: Never used   Substance and Sexual Activity    Alcohol use: Never    Drug use: Not Currently    Sexual activity: Yes     Partners: Male         Family History   Problem Relation Age of Onset    Diabetes Maternal Grandmother     Colon cancer Maternal Grandfather     Lung cancer Maternal Grandfather     Heart disease Maternal Grandfather     Stroke Maternal Grandfather   "   Stroke Paternal Grandfather        Objective  Physical Exam:  /56   Pulse 68   Temp 98.1 °F (36.7 °C) (Oral)   Resp 16   Ht 167.6 cm (66\")   Wt 61.2 kg (135 lb)   LMP 10/28/2023   SpO2 97%   BMI 21.79 kg/m²      Physical Exam    CONSTITUTIONAL: Well developed, no acute distress, nontoxic in appearance.  She is awake, alert, speaking in full sentences.  VITAL SIGNS: per nursing, reviewed and noted  SKIN: exposed skin with no rashes, ulcerations or petechiae  EYES: Grossly EOMI, no icterus, PERRL  ENT: Normal voice.  Nares patent, no facial asymmetry   RESPIRATORY:  No increased work of breathing. No retractions. Lung sounds clear to auscultation   CARDIOVASCULAR:  regular rate and rhythm  GI: Abdomen soft, patient has mild tenderness in the epigastric region without guarding or rigidity  MUSCULOSKELETAL:  No tenderness. Full ROM. Strength and tone grossly normal.  no spasms.  NEUROLOGIC: Alert, oriented x 3. No gross deficits. GCS 15.   PSYCH: appropriate affect.      Procedures    ED Course:        ED Course as of 10/28/23 1406   Sat Oct 28, 2023   1038 Patient not in room at this time. [LR]   1106 Glucose: 97 [LR]   1106 BUN: 17 [LR]   1106 Creatinine: 0.81 [LR]   1106 Sodium(!): 135 [LR]   1106 Potassium: 3.6 [LR]   1106 Chloride(!): 93 [LR]   1106 CO2: 26.2 [LR]   1106 Calcium: 9.6 [LR]   1106 Total Protein: 7.7 [LR]   1106 Albumin: 5.0 [LR]   1106 ALT (SGPT)(!): 57 [LR]   1106 AST (SGOT): 29 [LR]   1106 Alkaline Phosphatase: 86 [LR]   1106 Total Bilirubin(!): 1.6 [LR]   1107 Globulin: 2.7 [LR]   1107 A/G Ratio: 1.9 [LR]   1107 BUN/Creatinine Ratio: 21.0 [LR]   1107 Anion Gap(!): 15.8 [LR]   1107 eGFR: 103.5 [LR]   1107 WBC: 8.47 [LR]   1107 Hemoglobin: 14.5 [LR]   1107 Lipase: 30 [LR]   1119 EKG interpreted by me reveals sinus arrhythmia rate of 70 no ectopy no ischemic changes [PF]   1202 Narrative & Impression  PROCEDURE: XR CHEST 1 VW-     HISTORY: Chest tightness     COMPARISON: None.   "   FINDINGS: The heart is normal in size. The mediastinum is unremarkable.  The lungs are clear. There is no pneumothorax.  There are no acute  osseous abnormalities.     IMPRESSION:  No acute cardiopulmonary process.     Continued followup is recommended.           This report was signed and finalized on 10/28/2023 11:52 AM by Jackson Bowens DO.   [LR]   1236 Discussed findings with patient.  No significant tenderness or guarding of abdomen, vital signs stable.  Bilirubin is mildly elevated at 1.6 but other liver enzymes have improved in comparison to previous.  Do not believe she needs emergent CT today.  Discussed with Dr. Guzman as well.  I did tell patient that we needed a urine sample.  She states that she may need a catheter [LR]   1245 Patient once again told the nurse she may need a catheter, discussed that there is no medical reason that she should need a catheter and she would need to provide a urine sample when possible [LR]   1329 Patient told RN that she wanted to leave AMA.  I am still awaiting urinalysis.  She could have an underlying infection but she does not want to wait. [LR]      ED Course User Index  [LR] oJsie Ruff PA-C  [PF] Gary Guzman DO       Lab Results (last 24 hours)       Procedure Component Value Units Date/Time    CBC & Differential [[id number]]  (Abnormal) Collected: 10/28/23 1028    Specimen: Blood Updated: 10/28/23 1036    Narrative:      The following orders were created for panel order CBC & Differential.  Procedure                               Abnormality         Status                     ---------                               -----------         ------                     CBC Auto Differential[[id number]]        Abnormal            Final result                 Please view results for these tests on the individual orders.    Comprehensive Metabolic Panel [[id number]]  (Abnormal) Collected: 10/28/23 1028    Specimen: Blood Updated: 10/28/23 1054     Glucose 97 mg/dL       BUN 17 mg/dL      Creatinine 0.81 mg/dL      Sodium 135 mmol/L      Potassium 3.6 mmol/L      Chloride 93 mmol/L      CO2 26.2 mmol/L      Calcium 9.6 mg/dL      Total Protein 7.7 g/dL      Albumin 5.0 g/dL      ALT (SGPT) 57 U/L      AST (SGOT) 29 U/L      Alkaline Phosphatase 86 U/L      Total Bilirubin 1.6 mg/dL      Globulin 2.7 gm/dL      A/G Ratio 1.9 g/dL      BUN/Creatinine Ratio 21.0     Anion Gap 15.8 mmol/L      eGFR 103.5 mL/min/1.73     Narrative:      GFR Normal >60  Chronic Kidney Disease <60  Kidney Failure <15      Lipase [[id number]]  (Normal) Collected: 10/28/23 1028    Specimen: Blood Updated: 10/28/23 1054     Lipase 30 U/L     CBC Auto Differential [[id number]]  (Abnormal) Collected: 10/28/23 1028    Specimen: Blood Updated: 10/28/23 1036     WBC 8.47 10*3/mm3      RBC 4.74 10*6/mm3      Hemoglobin 14.5 g/dL      Hematocrit 41.5 %      MCV 87.6 fL      MCH 30.6 pg      MCHC 34.9 g/dL      RDW 13.2 %      RDW-SD 41.9 fl      MPV 8.8 fL      Platelets 331 10*3/mm3      Neutrophil % 74.5 %      Lymphocyte % 18.2 %      Monocyte % 6.6 %      Eosinophil % 0.1 %      Basophil % 0.2 %      Immature Grans % 0.4 %      Neutrophils, Absolute 6.31 10*3/mm3      Lymphocytes, Absolute 1.54 10*3/mm3      Monocytes, Absolute 0.56 10*3/mm3      Eosinophils, Absolute 0.01 10*3/mm3      Basophils, Absolute 0.02 10*3/mm3      Immature Grans, Absolute 0.03 10*3/mm3      nRBC 0.0 /100 WBC     Single High Sensitivity Troponin T [[id number]]  (Normal) Collected: 10/28/23 1028    Specimen: Blood Updated: 10/28/23 1107     HS Troponin T <6 ng/L     Narrative:      High Sensitive Troponin T Reference Range:  <10.0 ng/L- Negative Female for AMI  <15.0 ng/L- Negative Male for AMI  >=10 - Abnormal Female indicating possible myocardial injury.  >=15 - Abnormal Male indicating possible myocardial injury.   Clinicians would have to utilize clinical acumen, EKG, Troponin, and serial changes to determine if it is an Acute  Myocardial Infarction or myocardial injury due to an underlying chronic condition.         COVID-19 and FLU A/B PCR - Swab, Nasopharynx [[id number]]  (Normal) Collected: 10/28/23 1104    Specimen: Swab from Nasopharynx Updated: 10/28/23 1126     COVID19 Not Detected     Influenza A PCR Not Detected     Influenza B PCR Not Detected    Narrative:      Fact sheet for providers: https://www.fda.gov/media/333160/download    Fact sheet for patients: https://www.fda.gov/media/077748/download    Test performed by PCR.    Urinalysis With Microscopic If Indicated (No Culture) - Urine, Clean Catch [[id number]]  (Abnormal) Collected: 10/28/23 1304    Specimen: Urine, Clean Catch Updated: 10/28/23 1336     Color, UA Red     Appearance, UA Cloudy     pH, UA 5.5     Specific Gravity, UA >=1.030     Glucose, UA Negative     Ketones, UA >=160 mg/dL (4+)     Bilirubin, UA Negative     Blood, UA Large (3+)     Protein, UA >=300 mg/dL (3+)     Leuk Esterase, UA Trace     Nitrite, UA Negative     Urobilinogen, UA 1.0 E.U./dL    Pregnancy, Urine - Urine, Clean Catch [[id number]]  (Normal) Collected: 10/28/23 1304    Specimen: Urine, Clean Catch Updated: 10/28/23 1337     HCG, Urine QL Negative    Urine Drug Screen - Urine, Clean Catch [[id number]]  (Abnormal) Collected: 10/28/23 1304    Specimen: Urine, Clean Catch Updated: 10/28/23 1345     THC, Screen, Urine Positive     Phencyclidine (PCP), Urine Negative     Cocaine Screen, Urine Negative     Methamphetamine, Ur Negative     Opiate Screen Negative     Amphetamine Screen, Urine Negative     Benzodiazepine Screen, Urine Negative     Tricyclic Antidepressants Screen Negative     Methadone Screen, Urine Negative     Barbiturates Screen, Urine Negative     Oxycodone Screen, Urine Negative     Buprenorphine, Screen, Urine Negative    Narrative:      Cutoff For Drugs Screened:    Amphetamines               500 ng/ml  Barbiturates               200 ng/ml  Benzodiazepines            150  ng/ml  Cocaine                    150 ng/ml  Methadone                  200 ng/ml  Opiates                    100 ng/ml  Phencyclidine               25 ng/ml  THC                         50 ng/ml  Methamphetamine            500 ng/ml  Tricyclic Antidepressants  300 ng/ml  Oxycodone                  100 ng/ml  Buprenorphine               10 ng/ml    The normal value for all drugs tested is negative. This report includes unconfirmed screening results, with the cutoff values listed, to be used for medical treatment purposes only.  Unconfirmed results must not be used for non-medical purposes such as employment or legal testing.  Clinical consideration should be applied to any drug of abuse test, particularly when unconfirmed results are used.      Fentanyl, Urine - Urine, Clean Catch [[id number]]  (Normal) Collected: 10/28/23 1304    Specimen: Urine, Clean Catch Updated: 10/28/23 1345     Fentanyl, Urine Negative    Narrative:      Negative Threshold:      Fentanyl 5 ng/mL     The normal value for the drug tested is negative. This report includes final unconfirmed screening results to be used for medical treatment purposes only. Unconfirmed results must not be used for non-medical purposes such as employment or legal testing. Clinical consideration should be applied to any drug of abuse test, particularly when unconfirmed results are used.           Urinalysis, Microscopic Only - Urine, Clean Catch [[id number]]  (Abnormal) Collected: 10/28/23 1304    Specimen: Urine, Clean Catch Updated: 10/28/23 1337     RBC, UA Too Numerous to Count /HPF      WBC, UA 0-2 /HPF      Bacteria, UA None Seen /HPF      Squamous Epithelial Cells, UA 0-2 /HPF      Hyaline Casts, UA None Seen /LPF      Methodology Manual Light Microscopy             XR Chest 1 View    Result Date: 10/28/2023  PROCEDURE: XR CHEST 1 VW-  HISTORY: Chest tightness  COMPARISON: None.  FINDINGS: The heart is normal in size. The mediastinum is unremarkable. The lungs  are clear. There is no pneumothorax.  There are no acute osseous abnormalities.      Impression: No acute cardiopulmonary process.  Continued followup is recommended.    This report was signed and finalized on 10/28/2023 11:52 AM by Jackson Bowens DO.          MDM     Amount and/or Complexity of Data Reviewed  Clinical lab tests: reviewed  Tests in the radiology section of CPT®: reviewed  Tests in the medicine section of CPT®: reviewed  Decide to obtain previous medical records or to obtain history from someone other than the patient: yes        Initial impression of presenting illness: Patient is a 23-year-old female presenting to the ER for evaluation of vomiting and chest tightness.     DDX: includes but is not limited to: GERD, esophagitis, gastroenteritis, peptic ulcer disease, pancreatitis, cholelithiasis, asthma exacerbation, pneumonia, and other concerns    Patient arrives in overall stable condition with vitals interpreted by myself.     Pertinent features from physical exam: Vital signs stable, afebrile, mild tenderness in the suprapubic region without significant guarding or rigidity, lung sounds are clear.    Initial diagnostic plan: We will plan basic labs, lipase, urinalysis, UPT, chest x-ray, troponin, EKG.  Will give Zofran, IV fluids and GI cocktail    Results from initial plan were reviewed and interpreted by me revealing overall stable work-up.  There is no leukocytosis or anemia.  Sodium and chloride were a bit low, total bilirubin was 1.6 but ALT and AST had improved in comparison to previous.  Lipase was not elevated.  Troponin negative.  Decided to withhold CT scan at this time given there is no significant lab abnormalities or abdominal tenderness.    Diagnostic information from other sources: Chart review     Interventions / Re-evaluation: Patient was given IV fluids, droperidol Benadryl and Zofran She was also given a GI cocktail    Results/clinical rationale were discussed with patient.  I  discussed lab work.  She decided to leave AGAINST MEDICAL ADVICE before her urinalysis had returned so this was not discussed with the patient    Consultations/Discussion of results with other physicians: Dr. Guzman    Disposition plan: Discharge   -----    Final diagnoses:   Nausea and vomiting, unspecified vomiting type   Chest tightness   Upper abdominal pain          Josie Ruff PA-C  10/28/23 1129

## 2024-05-02 ENCOUNTER — INITIAL PRENATAL (OUTPATIENT)
Dept: OBSTETRICS AND GYNECOLOGY | Facility: CLINIC | Age: 24
End: 2024-05-02
Payer: MEDICAID

## 2024-05-02 VITALS — WEIGHT: 148 LBS | BODY MASS INDEX: 23.89 KG/M2 | SYSTOLIC BLOOD PRESSURE: 108 MMHG | DIASTOLIC BLOOD PRESSURE: 70 MMHG

## 2024-05-02 DIAGNOSIS — O09.299 IUGR (INTRAUTERINE GROWTH RESTRICTION) IN PRIOR PREGNANCY, PREGNANT: ICD-10-CM

## 2024-05-02 DIAGNOSIS — Z34.82 ENCOUNTER FOR SUPERVISION OF OTHER NORMAL PREGNANCY IN SECOND TRIMESTER: Primary | ICD-10-CM

## 2024-05-02 PROBLEM — Z34.90 PREGNANCY: Status: RESOLVED | Noted: 2021-05-04 | Resolved: 2024-05-02

## 2024-05-02 PROBLEM — O36.5930 POOR FETAL GROWTH AFFECTING MANAGEMENT OF MOTHER IN THIRD TRIMESTER: Status: RESOLVED | Noted: 2021-04-13 | Resolved: 2024-05-02

## 2024-05-02 NOTE — PROGRESS NOTES
Subjective     Chief Complaint   Patient presents with    Initial Prenatal Visit     New OB, 12w 1d, BRENNEN 24       Sharonda Saez is a 23 y.o. .  Patient's last menstrual period was 10/28/2023..  She presents to be seen to initiate prenatal care with our practice. She has been feeling well. Her first pregnancy was complicated by IUGR and she was induced at 37 weeks with .    Past Medical History:   Diagnosis Date    Anxiety     Asthma     Asthma     Depression      Social History     Socioeconomic History    Marital status: Single   Tobacco Use    Smoking status: Former     Current packs/day: 0.00     Average packs/day: 0.3 packs/day for 2.0 years (0.5 ttl pk-yrs)     Types: Cigarettes     Start date: 2018     Quit date: 2020     Years since quitting: 3.6    Smokeless tobacco: Never   Vaping Use    Vaping status: Former   Substance and Sexual Activity    Alcohol use: Never    Drug use: Not Currently     Comment: THC    Sexual activity: Yes     Partners: Male         The following portions of the patient's history were reviewed and updated as appropriate:vital signs, allergies, current medications, past family history, past medical history, past social history, past surgical history, and problem list.    Review of Systems -   /70   Wt 67.1 kg (148 lb)   LMP 10/28/2023   BMI 23.89 kg/m²   Gastrointestinal: minimal nausea and vomiting, denies constipation   Genitourinary: Frequency - denies urgency or burning with urination  All other systems reviewed and are negative    Objective     Physical Exam  Constitutional   The patient is alert, well developed & well nourished.   Neck   The neck is supple and the trachea is midline. The thyroid is not enlarged and there are no palpable nodules.   Respiratory  The patient is relaxed and breathes without effort. Lungs CTAB  Cardiovascular  Regular rate and rhythm without murmur -  Negative LE pitting edema  Gastrointestinal   The abdomen is  soft and non tender. No hepatosplenomegaly  Genitourinary   - External Genitalia without erythema, lesions, or masses  -Vagina - There is no abnormal vaginal discharge.   -Cervix without discharge  Negative cervical motion tenderness   Uterus - uterine body size is approximate to dates  Adnexa structures are without masses  Perineum is without inflammation or lesion  Skin  Normal color. No rashes or lesions  Extremities  Full ROM. No rashes or edema  Psychiatric  The patient is oriented to person, place, and time. Speech is fluent and words are clear    Imaging   OB ultrasound  12w1d, + FHT simple 3 cm cyst on right ovary.      Assessment & Plan     ASSESSMENT  IUP at 12w1d   Hx of IUGR  Discomforts of pregnancy.     PLAN  Tests ordered today:  Orders Placed This Encounter   Procedures    [id number] PLUS Core+SCA - Blood,     Order Specific Question:   [facility] Gestational age calculation method:     Answer:   BRENNEN,EDC     Order Specific Question:   Release to patient     Answer:   Routine Release [[id number]]    OB Panel With HIV     Order Specific Question:   Release to patient     Answer:   Routine Release [[id number]]    Urine Drug Screen - Urine, Clean Catch     Order Specific Question:   Release to patient     Answer:   Routine Release [[id number]]     Medications prescribed today:  No orders of the defined types were placed in this encounter.    Information reviewed: exercise in pregnancy, nutrition in pregnancy, weight gain in pregnancy, work and travel restrictions during pregnancy, list of OTC medications acceptable in pregnancy, and call coverage groups  Genetic testing reviewed: Cystic Fibrosis Screen  Shakira products, Vit B6 supp, Unisom, or seabands PRN      Follow up: 4 week(s)         This note was electronically signed.    Stacey St CNM  5/2/2024

## 2024-05-06 LAB
ABO GROUP BLD: ABNORMAL
AMPHETAMINES UR QL SCN: NEGATIVE NG/ML
BARBITURATES UR QL SCN: NEGATIVE NG/ML
BASOPHILS # BLD AUTO: 0 X10E3/UL (ref 0–0.2)
BASOPHILS NFR BLD AUTO: 0 %
BENZODIAZ UR QL SCN: NEGATIVE NG/ML
BLD GP AB SCN SERPL QL: NEGATIVE
BZE UR QL SCN: NEGATIVE NG/ML
CANNABINOIDS UR QL SCN: POSITIVE NG/ML
CFDNA.FET/CFDNA.TOTAL SFR FETUS: NORMAL %
CITATION REF LAB TEST: NORMAL
CREAT UR-MCNC: 181.1 MG/DL (ref 20–300)
EOSINOPHIL # BLD AUTO: 0.1 X10E3/UL (ref 0–0.4)
EOSINOPHIL NFR BLD AUTO: 1 %
ERYTHROCYTE [DISTWIDTH] IN BLOOD BY AUTOMATED COUNT: 13.7 % (ref 11.7–15.4)
FET 13+18+21+X+Y ANEUP PLAS.CFDNA: NEGATIVE
FET CHR 21 TS PLAS.CFDNA QL: NEGATIVE
FET MS X RISK WBC.DNA+CFDNA QL: NOT DETECTED
FET SEX PLAS.CFDNA DOSAGE CFDNA: NORMAL
FET TS 13 RISK PLAS.CFDNA QL: NEGATIVE
FET TS 18 RISK WBC.DNA+CFDNA QL: NEGATIVE
FET X + Y ANEUP RISK PLAS.CFDNA SEQ-IMP: NOT DETECTED
GA EST FROM CONCEPTION DATE: NORMAL D
GESTATIONAL AGE > 9:: YES
HBV SURFACE AG SERPL QL IA: NEGATIVE
HCT VFR BLD AUTO: 37.1 % (ref 34–46.6)
HCV IGG SERPL QL IA: NON REACTIVE
HGB BLD-MCNC: 11.9 G/DL (ref 11.1–15.9)
HIV 1+2 AB+HIV1 P24 AG SERPL QL IA: NON REACTIVE
IMM GRANULOCYTES # BLD AUTO: 0 X10E3/UL (ref 0–0.1)
IMM GRANULOCYTES NFR BLD AUTO: 0 %
LAB DIRECTOR NAME PROVIDER: NORMAL
LAB DIRECTOR NAME PROVIDER: NORMAL
LABORATORY COMMENT REPORT: ABNORMAL
LABORATORY COMMENT REPORT: NORMAL
LIMITATIONS OF THE TEST: NORMAL
LYMPHOCYTES # BLD AUTO: 1.9 X10E3/UL (ref 0.7–3.1)
LYMPHOCYTES NFR BLD AUTO: 26 %
MCH RBC QN AUTO: 29.7 PG (ref 26.6–33)
MCHC RBC AUTO-ENTMCNC: 32.1 G/DL (ref 31.5–35.7)
MCV RBC AUTO: 93 FL (ref 79–97)
METHADONE UR QL SCN: NEGATIVE NG/ML
MONOCYTES # BLD AUTO: 0.5 X10E3/UL (ref 0.1–0.9)
MONOCYTES NFR BLD AUTO: 7 %
NEGATIVE PREDICTIVE VALUE: NORMAL
NEUTROPHILS # BLD AUTO: 5 X10E3/UL (ref 1.4–7)
NEUTROPHILS NFR BLD AUTO: 66 %
NOTE: NORMAL
OPIATES UR QL SCN: NEGATIVE NG/ML
OXYCODONE+OXYMORPHONE UR QL SCN: NEGATIVE NG/ML
PCP UR QL: NEGATIVE NG/ML
PERFORMANCE CHARACTERISTICS: NORMAL
PH UR: 6 [PH] (ref 4.5–8.9)
PLATELET # BLD AUTO: 365 X10E3/UL (ref 150–450)
POSITIVE PREDICTIVE VALUE: NORMAL
PROPOXYPH UR QL SCN: NEGATIVE NG/ML
RBC # BLD AUTO: 4.01 X10E6/UL (ref 3.77–5.28)
REF LAB TEST METHOD: NORMAL
RH BLD: POSITIVE
RPR SER QL: NON REACTIVE
RUBV IGG SERPL IA-ACNC: <0.9 INDEX
TEST PERFORMANCE INFO SPEC: NORMAL
WBC # BLD AUTO: 7.5 X10E3/UL (ref 3.4–10.8)

## 2024-05-07 LAB — REF LAB TEST METHOD: NORMAL

## 2024-06-03 ENCOUNTER — ROUTINE PRENATAL (OUTPATIENT)
Dept: OBSTETRICS AND GYNECOLOGY | Facility: CLINIC | Age: 24
End: 2024-06-03
Payer: MEDICAID

## 2024-06-03 VITALS — SYSTOLIC BLOOD PRESSURE: 110 MMHG | BODY MASS INDEX: 24.86 KG/M2 | WEIGHT: 154 LBS | DIASTOLIC BLOOD PRESSURE: 64 MMHG

## 2024-06-03 DIAGNOSIS — O09.299 IUGR (INTRAUTERINE GROWTH RESTRICTION) IN PRIOR PREGNANCY, PREGNANT: ICD-10-CM

## 2024-06-03 DIAGNOSIS — Z34.92 PRENATAL CARE IN SECOND TRIMESTER: Primary | ICD-10-CM

## 2024-06-03 PROCEDURE — 99213 OFFICE O/P EST LOW 20 MIN: CPT | Performed by: OBSTETRICS & GYNECOLOGY

## 2024-06-03 NOTE — PROGRESS NOTES
Prenatal Care Visit    Subjective   Chief Complaint   Patient presents with    Routine Prenatal Visit     Left leg pain       History:   Sharonda is a  currently at 16w5d who presents for a prenatal care visit today.    Left leg pain + varicose veins.    Social History    Tobacco Use      Smoking status: Former        Packs/day: 0.00        Years: 0.3 packs/day for 2.0 years (0.5 ttl pk-yrs)        Types: Cigarettes        Start date: 2018        Quit date: 2020        Years since quitting: 3.7      Smokeless tobacco: Never       Objective   /64   Wt 69.9 kg (154 lb)   LMP 10/28/2023   BMI 24.86 kg/m²   Physical Exam:  Normal, gestational age-appropriate exam today        Plan   Medical Decision Making:    I have reviewed the prenatal labs and ultrasound(s) today. I have reviewed the most recent prenatal progress note(s).    Diagnosis: Supervision of low risk pregnancy  Varicose veins  IUGR with last pregnancy   Tests/Orders/Rx today: No orders of the defined types were placed in this encounter.      Medication Management: none     Topics discussed: Prenatal care milestones  IUGR  Varicose veins   Tests next visit: U/S for anatomic screening   Next visit: 3 week(s)     Norm Morin MD  Obstetrics and Gynecology  [facility]

## 2024-06-20 ENCOUNTER — ROUTINE PRENATAL (OUTPATIENT)
Dept: OBSTETRICS AND GYNECOLOGY | Facility: CLINIC | Age: 24
End: 2024-06-20
Payer: MEDICAID

## 2024-06-20 VITALS — SYSTOLIC BLOOD PRESSURE: 102 MMHG | WEIGHT: 157.7 LBS | DIASTOLIC BLOOD PRESSURE: 66 MMHG | BODY MASS INDEX: 25.45 KG/M2

## 2024-06-20 DIAGNOSIS — Z34.92 PRENATAL CARE IN SECOND TRIMESTER: ICD-10-CM

## 2024-06-20 DIAGNOSIS — K21.9 GASTROESOPHAGEAL REFLUX DISEASE WITHOUT ESOPHAGITIS: ICD-10-CM

## 2024-06-20 DIAGNOSIS — O09.92 ENCOUNTER FOR SUPERVISION OF HIGH RISK PREGNANCY IN SECOND TRIMESTER, ANTEPARTUM: Primary | ICD-10-CM

## 2024-06-20 DIAGNOSIS — O09.299 IUGR (INTRAUTERINE GROWTH RESTRICTION) IN PRIOR PREGNANCY, PREGNANT: ICD-10-CM

## 2024-06-20 PROCEDURE — 99214 OFFICE O/P EST MOD 30 MIN: CPT | Performed by: OBSTETRICS & GYNECOLOGY

## 2024-06-20 RX ORDER — FAMOTIDINE 20 MG/1
20 TABLET, FILM COATED ORAL 2 TIMES DAILY
Qty: 60 TABLET | Refills: 5 | Status: SHIPPED | OUTPATIENT
Start: 2024-06-20

## 2024-06-22 LAB
AFP INTERP SERPL-IMP: NORMAL
AFP INTERP SERPL-IMP: NORMAL
AFP MOM SERPL: 0.79
AFP SERPL-MCNC: 39.4 NG/ML
AGE AT DELIVERY: 26.1 YR
GA METHOD: NORMAL
GA: 19.1 WEEKS
IDDM PATIENT QL: NO
LABORATORY COMMENT REPORT: NORMAL
MULTIPLE PREGNANCY: NO
NEURAL TUBE DEFECT RISK FETUS: NORMAL %
RESULT: NORMAL

## 2024-06-24 NOTE — PROGRESS NOTES
Chief Complaint  Routine Prenatal Visit (Prenatal visit with Anatomy scan done today. No problems or concerns)    History of Present Illness:  Sharonda is a  currently at 19w5d who presents today with no significant complaints other than continued nausea and now reflux.  She has not been on any medications.  She denies any vaginal bleeding or spotting.  She is here for anatomic survey as well.  She desires MSAFP.  She did have previous genetic screening as noted.    Exam:  Vitals:  See prenatal flowsheet as noted and reviewed  General: Alert, cooperative, and does not appear in any distress  Abdomen:   See prenatal flowsheet as noted and reviewed    Uterus gravid, non-tender; no palpable masses    No guarding or rebound tenderness  Pelvic:  See prenatal flowsheet as noted and reviewed  Ext:  See prenatal flowsheet as noted and reviewed    Moves extremities well, no cyanosis and no redness  Urine:  See prenatal flowsheet as noted and reviewed    Data Review:  The following data was reviewed by: Anny Hoff MD on 2024:  Prenatal Labs:  Lab Results   Component Value Date    HGB 11.9 2024    RUBELLAABIGG <0.90 (L) 2024    HEPBSAG Negative 2024    ABO A 2024    RH Positive 2024    ABSCRN Negative 2024    SRA6LKJ4 Non Reactive 2024    HEPCVIRUSABY Non Reactive 2024    STREPGPB Positive (A) 2021       Routine Prenatal on 2024   Component Date Value    Result 2024 Report     AFP Maternal Test Results 2024 *Screen Negative*     Gestational Age 2024 19.1     Gestat. Age Based On 2024 BRENNEN     Maternal Age At BRENNEN 2024 26.1     Race 2024      Weight 2024 157     Insulin Dep Diabetes 2024 No     Multiple Gestation 2024 No     AFP Value 2024 39.4     AFP MoM 2024 0.79     OSBR Risk 1 IN 2024 10,000     MSAFP Interp 2024 Comment     Comment 2024 Comment       Imaging:  US Ob 14 + Weeks Single or First Gestation  Sharonda Saez  : 2000  MRN: [id number]  Date: 2024    Reason for exam/History:  Anatomic Survey    Ultrasound images are reviewed.  There is noted to be a viable   intrauterine pregnancy.   The pregnancy is measuring 19 weeks 0 days   gestation.  The fetal heart rate was normal.  Normal anatomy was not   noted.  The four-chamber heart and outflow tracts, facial profile, and   lower extremities were not visualized secondary to fetal positioning.  The   placental location was noted to be posterior.  The placenta is noted to be   1.4 cm from the cervix.  The amniotic fluid was normal.    The exam limitations noted:  none    See the official report for actual measurements and structures seen.    Anny Hoff MD, [facility]  OB GYN [place]      Medical Records:  None    Assessment and Plan:  Problem List Items Addressed This Visit          Gravid and     IUGR (intrauterine growth restriction) in prior pregnancy, pregnant  Patient will need to be followed closely for growth as discussed.     Other Visit Diagnoses       Encounter for supervision of high risk pregnancy in second trimester, antepartum    -  Primary  Topics discussed:     ab precautions  genetic screening - Today we discussed genetic testing.  She is aware that the MSAFP-4 is a screening test.  A screening test is not a diagnostic test.  This means that a negative test does not guarantee an unaffected fetus and a positive test does not mean the fetus has the condition for which the test is being performed.  If the test returns positive, a diagnostic test should be consider to determine if the fetus in fact has the condition.  After considering the options previously presented, she is interested in having genetic testing performed.  GERD management  kick counts and fetal movement  PIH precautions  Labs as noted.  Follow-up anatomic scan.    Relevant Orders     Alpha Fetoprotein, Maternal (Completed)    US Ob Limited 1 + Fetuses    Prenatal care in second trimester        Gastroesophageal reflux disease without esophagitis      Prescription given as noted.  Patient is to call if no improvement and/or worsening of her symptoms.    Relevant Medications    famotidine (PEPCID) 20 MG tablet          Follow Up/Instructions:  Follow up as scheduled.  Patient was given instructions and counseling regarding her condition or for health maintenance advice. Please see specific information pulled into the AVS if appropriate.     Note: Speech recognition transcription software may have been used to dictate portions of this document.  An attempt at proofreading has been made though minor errors in transcription may still be present.    This note was electronically signed.  Anny Hoff M.D.

## 2024-07-18 ENCOUNTER — ROUTINE PRENATAL (OUTPATIENT)
Dept: OBSTETRICS AND GYNECOLOGY | Facility: CLINIC | Age: 24
End: 2024-07-18
Payer: MEDICAID

## 2024-07-18 VITALS — BODY MASS INDEX: 26.95 KG/M2 | DIASTOLIC BLOOD PRESSURE: 62 MMHG | WEIGHT: 167 LBS | SYSTOLIC BLOOD PRESSURE: 116 MMHG

## 2024-07-18 DIAGNOSIS — Z34.82 ENCOUNTER FOR SUPERVISION OF OTHER NORMAL PREGNANCY IN SECOND TRIMESTER: Primary | ICD-10-CM

## 2024-07-18 DIAGNOSIS — Z87.59 HISTORY OF PRIOR PREGNANCY WITH IUGR NEWBORN: ICD-10-CM

## 2024-07-18 NOTE — PROGRESS NOTES
Prenatal Care Visit    Subjective   Chief Complaint   Patient presents with    Routine Prenatal Visit     Anatomy scan done today. No concerns.     History:   Sharonda is a  currently at 23w1d who presents for a prenatal care visit today.    Doing well. Denies CTX, VB, LOF. Reports (+) FM.     Objective   /62   Wt 75.8 kg (167 lb)   LMP 10/28/2023   BMI 26.95 kg/m²   Physical Exam:  Normal, gestational age-appropriate exam today      Assessment & Plan     IUP @ 23w1d  Routine care: I have reviewed the prenatal labs and ultrasound(s) today. I have reviewed the most recent prenatal progress note(s). Anatomy completion sono today - all anatomy now seen and cleared as normal.  H/o FGR: EFW 30% on anatomy scan  Low lying placenta: resolved  Rubella nonimmune: MMR PP     Diagnosis Plan   1. Encounter for supervision of other normal pregnancy in second trimester        2. History of prior pregnancy with IUGR            Medication Management: continue PNV    Topics discussed: Prenatal care milestones  Kick counts and fetal movement   labor signs and symptoms   Tests next visit: CBC, GTT   Next visit: 4 week(s)     Jjoo Veloz MD  Obstetrics and Gynecology  [facility]

## 2024-08-12 ENCOUNTER — ROUTINE PRENATAL (OUTPATIENT)
Dept: OBSTETRICS AND GYNECOLOGY | Facility: CLINIC | Age: 24
End: 2024-08-12
Payer: MEDICAID

## 2024-08-12 VITALS — DIASTOLIC BLOOD PRESSURE: 70 MMHG | WEIGHT: 177 LBS | BODY MASS INDEX: 28.57 KG/M2 | SYSTOLIC BLOOD PRESSURE: 110 MMHG

## 2024-08-12 DIAGNOSIS — O09.299 IUGR (INTRAUTERINE GROWTH RESTRICTION) IN PRIOR PREGNANCY, PREGNANT: ICD-10-CM

## 2024-08-12 DIAGNOSIS — Z34.82 ENCOUNTER FOR SUPERVISION OF OTHER NORMAL PREGNANCY IN SECOND TRIMESTER: Primary | ICD-10-CM

## 2024-08-12 LAB
BASOPHILS # BLD AUTO: 0.04 10*3/MM3 (ref 0–0.2)
BASOPHILS NFR BLD AUTO: 0.5 % (ref 0–1.5)
EOSINOPHIL # BLD AUTO: 0.08 10*3/MM3 (ref 0–0.4)
EOSINOPHIL NFR BLD AUTO: 0.9 % (ref 0.3–6.2)
ERYTHROCYTE [DISTWIDTH] IN BLOOD BY AUTOMATED COUNT: 11.8 % (ref 12.3–15.4)
GLUCOSE 1H P 50 G GLC PO SERPL-MCNC: 105 MG/DL (ref 65–139)
HCT VFR BLD AUTO: 29.9 % (ref 34–46.6)
HGB BLD-MCNC: 10 G/DL (ref 12–15.9)
IMM GRANULOCYTES # BLD AUTO: 0.05 10*3/MM3 (ref 0–0.05)
IMM GRANULOCYTES NFR BLD AUTO: 0.6 % (ref 0–0.5)
LYMPHOCYTES # BLD AUTO: 2.31 10*3/MM3 (ref 0.7–3.1)
LYMPHOCYTES NFR BLD AUTO: 26.2 % (ref 19.6–45.3)
MCH RBC QN AUTO: 31.2 PG (ref 26.6–33)
MCHC RBC AUTO-ENTMCNC: 33.4 G/DL (ref 31.5–35.7)
MCV RBC AUTO: 93.1 FL (ref 79–97)
MONOCYTES # BLD AUTO: 0.7 10*3/MM3 (ref 0.1–0.9)
MONOCYTES NFR BLD AUTO: 7.9 % (ref 5–12)
NEUTROPHILS # BLD AUTO: 5.65 10*3/MM3 (ref 1.7–7)
NEUTROPHILS NFR BLD AUTO: 63.9 % (ref 42.7–76)
NRBC BLD AUTO-RTO: 0 /100 WBC (ref 0–0.2)
PLATELET # BLD AUTO: 265 10*3/MM3 (ref 140–450)
RBC # BLD AUTO: 3.21 10*6/MM3 (ref 3.77–5.28)
WBC # BLD AUTO: 8.83 10*3/MM3 (ref 3.4–10.8)

## 2024-08-12 PROCEDURE — 99213 OFFICE O/P EST LOW 20 MIN: CPT | Performed by: MIDWIFE

## 2024-08-12 NOTE — PROGRESS NOTES
Chief Complaint   Patient presents with    Routine Prenatal Visit     Glucola today with no complaints/concerns       HPI: Sharonda is a  currently at 26w5d here for prenatal visit who reports the following:  Baby is active. She works at [employer] and eats there mostly. She denies any ctxs. She is doing glucola today.                EXAM:     Vitals:    24 1001   BP: 110/70      Abdomen:   See prenatal flowsheet as noted and reviewed, soft, nontender   Pelvic:  See prenatal flowsheet as noted and reviewed   Urine:  See prenatal flowsheet as noted and reviewed    Lab Results   Component Value Date    ABO A 2024    RH Positive 2024    ABSCRN Negative 2024       MDM:  Impression: Supervision of low risk pregnancy   Tests done today: GCT  HgB   Topics discussed: kick counts and fetal movement   labor signs and symptoms  Healthy eating  Reviewed OB labs   Tests next visit: none                RTO:                        4 weeks    This note was electronically signed.  Stacey St, COURTNEY  2024

## 2024-08-13 RX ORDER — FERROUS SULFATE 324(65)MG
324 TABLET, DELAYED RELEASE (ENTERIC COATED) ORAL 2 TIMES DAILY WITH MEALS
Qty: 60 TABLET | Refills: 6 | Status: SHIPPED | OUTPATIENT
Start: 2024-08-13

## 2024-09-05 ENCOUNTER — ROUTINE PRENATAL (OUTPATIENT)
Dept: OBSTETRICS AND GYNECOLOGY | Facility: CLINIC | Age: 24
End: 2024-09-05
Payer: MEDICAID

## 2024-09-05 VITALS — BODY MASS INDEX: 28.63 KG/M2 | SYSTOLIC BLOOD PRESSURE: 112 MMHG | WEIGHT: 177.4 LBS | DIASTOLIC BLOOD PRESSURE: 68 MMHG

## 2024-09-05 DIAGNOSIS — Z34.93 PRENATAL CARE IN THIRD TRIMESTER: Primary | ICD-10-CM

## 2024-09-05 DIAGNOSIS — O99.019 MATERNAL ANEMIA IN PREGNANCY, ANTEPARTUM: ICD-10-CM

## 2024-09-05 DIAGNOSIS — O09.299 IUGR (INTRAUTERINE GROWTH RESTRICTION) IN PRIOR PREGNANCY, PREGNANT: ICD-10-CM

## 2024-09-05 NOTE — PROGRESS NOTES
Prenatal Care Visit    Subjective   Chief Complaint   Patient presents with    Routine Prenatal Visit     No concerns.     History:   Sharonda is a  currently at 30w1d who presents for a prenatal care visit today.    Reports some [place]. Denies VB, LOF. Reports (+) FM.     Objective   /68   Wt 80.5 kg (177 lb 6.4 oz)   LMP 10/28/2023   BMI 28.63 kg/m²   Physical Exam:  Normal, gestational age-appropriate exam today      Assessment & Plan     IUP @ 30w1d  Routine care: I have reviewed the prenatal labs and ultrasound(s) today. I have reviewed the most recent prenatal progress note(s).   H/o FGR: EFW 30% on anatomy scan, f/u growth scan next visit  Rubella nonimmune: MMR PP  Anemia: continue iron supplement     Diagnosis Plan   1. Prenatal care in third trimester        2. IUGR (intrauterine growth restriction) in prior pregnancy, pregnant        3. Maternal anemia in pregnancy, antepartum           Medication Management: continue PNV, iron    Topics discussed: Prenatal care milestones  Iron supplementation  TDAP vaccination  Kick counts and fetal movement   labor signs and symptoms   Tests next visit: U/S for EFW   Next visit: 2 week(s)     Jojo Veloz MD  Obstetrics and Gynecology  [facility]

## 2024-09-23 ENCOUNTER — ROUTINE PRENATAL (OUTPATIENT)
Dept: OBSTETRICS AND GYNECOLOGY | Facility: CLINIC | Age: 24
End: 2024-09-23
Payer: MEDICAID

## 2024-09-23 VITALS — WEIGHT: 184 LBS | DIASTOLIC BLOOD PRESSURE: 68 MMHG | BODY MASS INDEX: 29.7 KG/M2 | SYSTOLIC BLOOD PRESSURE: 118 MMHG

## 2024-09-23 DIAGNOSIS — O09.299 IUGR (INTRAUTERINE GROWTH RESTRICTION) IN PRIOR PREGNANCY, PREGNANT: Primary | ICD-10-CM

## 2024-09-23 PROCEDURE — 99213 OFFICE O/P EST LOW 20 MIN: CPT | Performed by: MIDWIFE

## 2024-10-07 ENCOUNTER — DOCUMENTATION (OUTPATIENT)
Dept: SOCIAL WORK | Facility: HOSPITAL | Age: 24
End: 2024-10-07

## 2024-10-07 NOTE — PROGRESS NOTES
Case Management/Social Work    Patient Name:  Sharonda Saez  YOB: 2000  MRN: [id number]  Admit Date:  (Not on file)        JIL arranged for [employer] [facility] to transport pt back home. Pt signed waiver.       Electronically signed by:  YIN Chris  10/07/24 11:19 EDT

## 2024-11-04 ENCOUNTER — ROUTINE PRENATAL (OUTPATIENT)
Dept: OBSTETRICS AND GYNECOLOGY | Facility: CLINIC | Age: 24
End: 2024-11-04
Payer: MEDICAID

## 2024-11-04 VITALS — SYSTOLIC BLOOD PRESSURE: 120 MMHG | WEIGHT: 199.2 LBS | DIASTOLIC BLOOD PRESSURE: 64 MMHG | BODY MASS INDEX: 32.15 KG/M2

## 2024-11-04 DIAGNOSIS — Z36.85 ENCOUNTER FOR ANTENATAL SCREENING FOR STREPTOCOCCUS B: Primary | ICD-10-CM

## 2024-11-04 NOTE — PROGRESS NOTES
Chief Complaint   Patient presents with    Routine Prenatal Visit     Prenatal visit with GBS done today. No problems or concerns.        HPI:   , 38w5d gestation reports doing well    ROS:  See Prenatal Episode/Flowsheet  /64   Wt 90.4 kg (199 lb 3.2 oz)   LMP 10/28/2023   BMI 32.15 kg/m²      EXAM:  EXTREMITIES:  No swelling-See Prenatal Episode/Flowsheet    ABDOMEN:  FHTs/Movement noted-See Prenatal Episode/Flowsheet    URINE GLUCOSE/PROTEIN:  See Prenatal Episode/Flowsheet    PELVIC EXAM:  See Prenatal Episode/Flowsheet  CV:  Lungs:  GYN:    MDM:    Lab Results   Component Value Date    HGB 10.0 (L) 2024    RUBELLAABIGG <0.90 (L) 2024    HEPBSAG Negative 2024    ABO A 2024    RH Positive 2024    ABSCRN Negative 2024    EQK6QDL9 Non Reactive 2024    HEPCVIRUSABY Non Reactive 2024    STREPGPB Positive (A) 2021       U/S:US Ob Follow Up Transabdominal Approach (2024 09:23)     1. IUP 38w5d  2. Routine care   3. GBS done  4. Cervix: ft/50%  5. Anemia: taking Fe and PNV

## 2024-11-09 LAB
CLINDAMYCIN ISLT KB: ABNORMAL
GP B STREP DNA SPEC QL NAA+PROBE: POSITIVE
ORGANISM ID: ABNORMAL

## 2024-11-11 ENCOUNTER — PREP FOR SURGERY (OUTPATIENT)
Dept: OTHER | Facility: HOSPITAL | Age: 24
End: 2024-11-11
Payer: MEDICAID

## 2024-11-11 ENCOUNTER — ROUTINE PRENATAL (OUTPATIENT)
Dept: OBSTETRICS AND GYNECOLOGY | Facility: CLINIC | Age: 24
End: 2024-11-11
Payer: MEDICAID

## 2024-11-11 VITALS — BODY MASS INDEX: 32.14 KG/M2 | WEIGHT: 199.1 LBS | SYSTOLIC BLOOD PRESSURE: 110 MMHG | DIASTOLIC BLOOD PRESSURE: 72 MMHG

## 2024-11-11 DIAGNOSIS — Z34.93 PRENATAL CARE IN THIRD TRIMESTER: Primary | ICD-10-CM

## 2024-11-11 DIAGNOSIS — O99.019 MATERNAL ANEMIA IN PREGNANCY, ANTEPARTUM: ICD-10-CM

## 2024-11-11 DIAGNOSIS — Z87.59 HISTORY OF PRIOR PREGNANCY WITH IUGR NEWBORN: ICD-10-CM

## 2024-11-11 DIAGNOSIS — O99.820 GBS (GROUP B STREPTOCOCCUS CARRIER), +RV CULTURE, CURRENTLY PREGNANT: ICD-10-CM

## 2024-11-11 PROCEDURE — 99213 OFFICE O/P EST LOW 20 MIN: CPT | Performed by: STUDENT IN AN ORGANIZED HEALTH CARE EDUCATION/TRAINING PROGRAM

## 2024-11-11 RX ORDER — PROMETHAZINE HYDROCHLORIDE 12.5 MG/1
12.5 TABLET ORAL EVERY 6 HOURS PRN
Status: CANCELLED | OUTPATIENT
Start: 2024-11-11

## 2024-11-11 RX ORDER — MISOPROSTOL 200 UG/1
800 TABLET ORAL AS NEEDED
Status: CANCELLED | OUTPATIENT
Start: 2024-11-11

## 2024-11-11 RX ORDER — MAGNESIUM CARB/ALUMINUM HYDROX 105-160MG
30 TABLET,CHEWABLE ORAL ONCE
Status: CANCELLED | OUTPATIENT
Start: 2024-11-11 | End: 2024-11-11

## 2024-11-11 RX ORDER — MORPHINE SULFATE 2 MG/ML
2 INJECTION, SOLUTION INTRAMUSCULAR; INTRAVENOUS
Status: CANCELLED | OUTPATIENT
Start: 2024-11-11 | End: 2024-11-21

## 2024-11-11 RX ORDER — ONDANSETRON 4 MG/1
4 TABLET, ORALLY DISINTEGRATING ORAL EVERY 6 HOURS PRN
Status: CANCELLED | OUTPATIENT
Start: 2024-11-11

## 2024-11-11 RX ORDER — SODIUM CHLORIDE 0.9 % (FLUSH) 0.9 %
10 SYRINGE (ML) INJECTION EVERY 12 HOURS SCHEDULED
Status: CANCELLED | OUTPATIENT
Start: 2024-11-11

## 2024-11-11 RX ORDER — ACETAMINOPHEN 325 MG/1
650 TABLET ORAL EVERY 4 HOURS PRN
Status: CANCELLED | OUTPATIENT
Start: 2024-11-11

## 2024-11-11 RX ORDER — PROMETHAZINE HYDROCHLORIDE 12.5 MG/1
12.5 SUPPOSITORY RECTAL EVERY 6 HOURS PRN
Status: CANCELLED | OUTPATIENT
Start: 2024-11-11

## 2024-11-11 RX ORDER — CARBOPROST TROMETHAMINE 250 UG/ML
250 INJECTION, SOLUTION INTRAMUSCULAR AS NEEDED
Status: CANCELLED | OUTPATIENT
Start: 2024-11-11

## 2024-11-11 RX ORDER — OXYTOCIN/0.9 % SODIUM CHLORIDE 30/500 ML
250 PLASTIC BAG, INJECTION (ML) INTRAVENOUS CONTINUOUS
Status: CANCELLED | OUTPATIENT
Start: 2024-11-11 | End: 2024-11-11

## 2024-11-11 RX ORDER — LIDOCAINE HYDROCHLORIDE 10 MG/ML
0.5 INJECTION, SOLUTION INFILTRATION; PERINEURAL ONCE AS NEEDED
Status: CANCELLED | OUTPATIENT
Start: 2024-11-11

## 2024-11-11 RX ORDER — IBUPROFEN 600 MG/1
600 TABLET, FILM COATED ORAL EVERY 6 HOURS PRN
Status: CANCELLED | OUTPATIENT
Start: 2024-11-11

## 2024-11-11 RX ORDER — MISOPROSTOL 100 MCG
25 TABLET ORAL ONCE
Status: CANCELLED | OUTPATIENT
Start: 2024-11-11 | End: 2024-11-11

## 2024-11-11 RX ORDER — SODIUM CHLORIDE, SODIUM LACTATE, POTASSIUM CHLORIDE, CALCIUM CHLORIDE 600; 310; 30; 20 MG/100ML; MG/100ML; MG/100ML; MG/100ML
125 INJECTION, SOLUTION INTRAVENOUS CONTINUOUS
Status: CANCELLED | OUTPATIENT
Start: 2024-11-11 | End: 2024-11-12

## 2024-11-11 RX ORDER — OXYTOCIN/0.9 % SODIUM CHLORIDE 30/500 ML
999 PLASTIC BAG, INJECTION (ML) INTRAVENOUS ONCE
Status: CANCELLED | OUTPATIENT
Start: 2024-11-11 | End: 2024-11-11

## 2024-11-11 RX ORDER — ONDANSETRON 2 MG/ML
4 INJECTION INTRAMUSCULAR; INTRAVENOUS EVERY 6 HOURS PRN
Status: CANCELLED | OUTPATIENT
Start: 2024-11-11

## 2024-11-11 RX ORDER — SODIUM CHLORIDE 9 MG/ML
40 INJECTION, SOLUTION INTRAVENOUS AS NEEDED
Status: CANCELLED | OUTPATIENT
Start: 2024-11-11

## 2024-11-11 RX ORDER — SODIUM CHLORIDE 0.9 % (FLUSH) 0.9 %
10 SYRINGE (ML) INJECTION AS NEEDED
Status: CANCELLED | OUTPATIENT
Start: 2024-11-11

## 2024-11-11 RX ORDER — METHYLERGONOVINE MALEATE 0.2 MG/ML
200 INJECTION INTRAVENOUS ONCE AS NEEDED
Status: CANCELLED | OUTPATIENT
Start: 2024-11-11

## 2024-11-11 RX ORDER — OXYTOCIN/0.9 % SODIUM CHLORIDE 30/500 ML
2-20 PLASTIC BAG, INJECTION (ML) INTRAVENOUS
Status: CANCELLED | OUTPATIENT
Start: 2024-11-11

## 2024-11-11 NOTE — PROGRESS NOTES
Prenatal Care Visit    Subjective   Chief Complaint   Patient presents with    Routine Prenatal Visit     No concerns. Cervix checked today.      History:   Sharonda is a  currently at 39w5d who presents for a prenatal care visit today.    Reports increased pelvic pressure. Denies regular CTX, VB, LOF. Reports (+) FM.     Objective   /72   Wt 90.3 kg (199 lb 1.6 oz)   LMP 10/28/2023   BMI 32.14 kg/m²   Physical Exam:  Normal, gestational age-appropriate exam today   CVX: /3, posterior, medium consistency     Assessment & Plan     IUP @ 39w5d  Routine care: I have reviewed the prenatal labs and ultrasound(s) today. I have reviewed the most recent prenatal progress note(s). GBS (+)  H/o FGR: normal growth this pregnancy, EFW 26%  Rubella nonimmune: MMR PP  Anemia: continue iron supplement  GBS (+): plan PCN intrapartum     Diagnosis Plan   1. Prenatal care in third trimester        2. History of prior pregnancy with IUGR         3. Maternal anemia in pregnancy, antepartum        4. GBS (group B Streptococcus carrier), +RV culture, currently pregnant           Medication Management: continue PNV, iron    Topics discussed: Prenatal care milestones  Kick counts and fetal movement  Labor signs and symptoms  Birth plan  Induction of labor - scheduled 24 at 8PM. Plan cytotec x 1 prior to starting pitocin.   Tests next visit: none   Next visit: 6 week(s) for postpartum f/u     Jojo Veloz MD  Obstetrics and Gynecology  [facility]

## 2024-11-12 ENCOUNTER — HOSPITAL ENCOUNTER (OUTPATIENT)
Dept: LABOR AND DELIVERY | Facility: HOSPITAL | Age: 24
Discharge: HOME | End: 2024-11-12
Payer: MEDICAID

## 2024-11-12 ENCOUNTER — HOSPITAL ENCOUNTER (INPATIENT)
Facility: HOSPITAL | Age: 24
LOS: 3 days | Discharge: HOME | End: 2024-11-15
Attending: MIDWIFE | Admitting: MIDWIFE
Payer: MEDICAID

## 2024-11-12 PROBLEM — Z34.90 PREGNANCY: Status: ACTIVE | Noted: 2024-11-12

## 2024-11-12 LAB
ABO GROUP BLD: NORMAL
AMPHET+METHAMPHET UR QL: NEGATIVE
AMPHETAMINES UR QL: NEGATIVE
BARBITURATES UR QL SCN: NEGATIVE
BENZODIAZ UR QL SCN: NEGATIVE
BILIRUB BLD-MCNC: NEGATIVE MG/DL
BLD GP AB SCN SERPL QL: NEGATIVE
BUPRENORPHINE SERPL-MCNC: NEGATIVE NG/ML
CANNABINOIDS SERPL QL: NEGATIVE
CLARITY, POC: CLEAR
COCAINE UR QL: NEGATIVE
COLOR UR: YELLOW
DEPRECATED RDW RBC AUTO: 42.2 FL (ref 37–54)
ERYTHROCYTE [DISTWIDTH] IN BLOOD BY AUTOMATED COUNT: 13.6 % (ref 12.3–15.4)
FENTANYL UR-MCNC: NEGATIVE NG/ML
GLUCOSE UR STRIP-MCNC: NEGATIVE MG/DL
HCT VFR BLD AUTO: 30.7 % (ref 34–46.6)
HGB BLD-MCNC: 9.9 G/DL (ref 12–15.9)
KETONES UR QL: NEGATIVE
LEUKOCYTE EST, POC: NEGATIVE
MCH RBC QN AUTO: 27 PG (ref 26.6–33)
MCHC RBC AUTO-ENTMCNC: 32.2 G/DL (ref 31.5–35.7)
MCV RBC AUTO: 83.9 FL (ref 79–97)
METHADONE UR QL SCN: NEGATIVE
NITRITE UR-MCNC: NEGATIVE MG/ML
OPIATES UR QL: NEGATIVE
OXYCODONE UR QL SCN: NEGATIVE
PCP UR QL SCN: NEGATIVE
PH UR: 6.5 [PH] (ref 5–8)
PLATELET # BLD AUTO: 248 10*3/MM3 (ref 140–450)
PMV BLD AUTO: 9.5 FL (ref 6–12)
PROT UR STRIP-MCNC: NEGATIVE MG/DL
RBC # BLD AUTO: 3.66 10*6/MM3 (ref 3.77–5.28)
RBC # UR STRIP: NEGATIVE /UL
RH BLD: POSITIVE
SP GR UR: 1.03 (ref 1–1.03)
T&S EXPIRATION DATE: NORMAL
TRICYCLICS UR QL SCN: NEGATIVE
UROBILINOGEN UR QL: NORMAL
WBC NRBC COR # BLD AUTO: 7.83 10*3/MM3 (ref 3.4–10.8)

## 2024-11-12 PROCEDURE — 86780 TREPONEMA PALLIDUM: CPT | Performed by: STUDENT IN AN ORGANIZED HEALTH CARE EDUCATION/TRAINING PROGRAM

## 2024-11-12 PROCEDURE — 59025 FETAL NON-STRESS TEST: CPT

## 2024-11-12 PROCEDURE — 86850 RBC ANTIBODY SCREEN: CPT | Performed by: STUDENT IN AN ORGANIZED HEALTH CARE EDUCATION/TRAINING PROGRAM

## 2024-11-12 PROCEDURE — 25810000003 LACTATED RINGERS PER 1000 ML: Performed by: STUDENT IN AN ORGANIZED HEALTH CARE EDUCATION/TRAINING PROGRAM

## 2024-11-12 PROCEDURE — 81002 URINALYSIS NONAUTO W/O SCOPE: CPT | Performed by: STUDENT IN AN ORGANIZED HEALTH CARE EDUCATION/TRAINING PROGRAM

## 2024-11-12 PROCEDURE — 85027 COMPLETE CBC AUTOMATED: CPT | Performed by: STUDENT IN AN ORGANIZED HEALTH CARE EDUCATION/TRAINING PROGRAM

## 2024-11-12 PROCEDURE — 86901 BLOOD TYPING SEROLOGIC RH(D): CPT | Performed by: STUDENT IN AN ORGANIZED HEALTH CARE EDUCATION/TRAINING PROGRAM

## 2024-11-12 PROCEDURE — 86900 BLOOD TYPING SEROLOGIC ABO: CPT | Performed by: STUDENT IN AN ORGANIZED HEALTH CARE EDUCATION/TRAINING PROGRAM

## 2024-11-12 PROCEDURE — 25010000002 PENICILLIN G POTASSIUM PER 600000 UNITS: Performed by: MIDWIFE

## 2024-11-12 PROCEDURE — 80307 DRUG TEST PRSMV CHEM ANLYZR: CPT | Performed by: MIDWIFE

## 2024-11-12 RX ORDER — PENICILLIN G 3000000 [IU]/50ML
3 INJECTION, SOLUTION INTRAVENOUS EVERY 4 HOURS
Status: DISCONTINUED | OUTPATIENT
Start: 2024-11-13 | End: 2024-11-13

## 2024-11-12 RX ORDER — MAGNESIUM CARB/ALUMINUM HYDROX 105-160MG
30 TABLET,CHEWABLE ORAL ONCE
Status: DISCONTINUED | OUTPATIENT
Start: 2024-11-12 | End: 2024-11-13

## 2024-11-12 RX ORDER — PROMETHAZINE HYDROCHLORIDE 12.5 MG/1
12.5 SUPPOSITORY RECTAL EVERY 6 HOURS PRN
Status: DISCONTINUED | OUTPATIENT
Start: 2024-11-12 | End: 2024-11-13

## 2024-11-12 RX ORDER — OXYTOCIN/0.9 % SODIUM CHLORIDE 30/500 ML
2-20 PLASTIC BAG, INJECTION (ML) INTRAVENOUS
Status: DISCONTINUED | OUTPATIENT
Start: 2024-11-12 | End: 2024-11-12

## 2024-11-12 RX ORDER — ACETAMINOPHEN 325 MG/1
650 TABLET ORAL EVERY 4 HOURS PRN
Status: DISCONTINUED | OUTPATIENT
Start: 2024-11-12 | End: 2024-11-13

## 2024-11-12 RX ORDER — SODIUM CHLORIDE, SODIUM LACTATE, POTASSIUM CHLORIDE, CALCIUM CHLORIDE 600; 310; 30; 20 MG/100ML; MG/100ML; MG/100ML; MG/100ML
125 INJECTION, SOLUTION INTRAVENOUS CONTINUOUS
Status: DISCONTINUED | OUTPATIENT
Start: 2024-11-12 | End: 2024-11-13

## 2024-11-12 RX ORDER — PENICILLIN G 3000000 [IU]/50ML
3 INJECTION, SOLUTION INTRAVENOUS
Status: COMPLETED | OUTPATIENT
Start: 2024-11-12 | End: 2024-11-12

## 2024-11-12 RX ORDER — LIDOCAINE HYDROCHLORIDE 10 MG/ML
0.5 INJECTION, SOLUTION INFILTRATION; PERINEURAL ONCE AS NEEDED
Status: DISCONTINUED | OUTPATIENT
Start: 2024-11-12 | End: 2024-11-13

## 2024-11-12 RX ORDER — OXYTOCIN/0.9 % SODIUM CHLORIDE 30/500 ML
2-20 PLASTIC BAG, INJECTION (ML) INTRAVENOUS
Status: DISCONTINUED | OUTPATIENT
Start: 2024-11-13 | End: 2024-11-13

## 2024-11-12 RX ORDER — HYDROXYZINE HYDROCHLORIDE 25 MG/1
50 TABLET, FILM COATED ORAL 3 TIMES DAILY PRN
Status: DISCONTINUED | OUTPATIENT
Start: 2024-11-12 | End: 2024-11-13

## 2024-11-12 RX ORDER — SODIUM CHLORIDE 9 MG/ML
40 INJECTION, SOLUTION INTRAVENOUS AS NEEDED
Status: DISCONTINUED | OUTPATIENT
Start: 2024-11-12 | End: 2024-11-13

## 2024-11-12 RX ORDER — FAMOTIDINE 10 MG/ML
20 INJECTION, SOLUTION INTRAVENOUS ONCE
Status: COMPLETED | OUTPATIENT
Start: 2024-11-13 | End: 2024-11-12

## 2024-11-12 RX ORDER — PROMETHAZINE HYDROCHLORIDE 12.5 MG/1
12.5 TABLET ORAL EVERY 6 HOURS PRN
Status: DISCONTINUED | OUTPATIENT
Start: 2024-11-12 | End: 2024-11-13

## 2024-11-12 RX ORDER — SODIUM CHLORIDE 0.9 % (FLUSH) 0.9 %
10 SYRINGE (ML) INJECTION AS NEEDED
Status: DISCONTINUED | OUTPATIENT
Start: 2024-11-12 | End: 2024-11-13

## 2024-11-12 RX ORDER — SODIUM CHLORIDE 0.9 % (FLUSH) 0.9 %
10 SYRINGE (ML) INJECTION EVERY 12 HOURS SCHEDULED
Status: DISCONTINUED | OUTPATIENT
Start: 2024-11-12 | End: 2024-11-13

## 2024-11-12 RX ORDER — MISOPROSTOL 100 MCG
25 TABLET ORAL ONCE
Status: COMPLETED | OUTPATIENT
Start: 2024-11-12 | End: 2024-11-12

## 2024-11-12 RX ADMIN — Medication 25 MCG: at 21:27

## 2024-11-12 RX ADMIN — HYDROXYZINE HYDROCHLORIDE 50 MG: 25 TABLET ORAL at 23:59

## 2024-11-12 RX ADMIN — PENICILLIN G 3 MILLION UNITS: 3000000 INJECTION, SOLUTION INTRAVENOUS at 21:53

## 2024-11-12 RX ADMIN — FAMOTIDINE 20 MG: 10 INJECTION INTRAVENOUS at 23:28

## 2024-11-12 RX ADMIN — SODIUM CHLORIDE, POTASSIUM CHLORIDE, SODIUM LACTATE AND CALCIUM CHLORIDE 125 ML/HR: 600; 310; 30; 20 INJECTION, SOLUTION INTRAVENOUS at 21:27

## 2024-11-12 RX ADMIN — PENICILLIN G 3 MILLION UNITS: 3000000 INJECTION, SOLUTION INTRAVENOUS at 21:26

## 2024-11-13 ENCOUNTER — ANESTHESIA (OUTPATIENT)
Dept: LABOR AND DELIVERY | Facility: HOSPITAL | Age: 24
End: 2024-11-13
Payer: MEDICAID

## 2024-11-13 ENCOUNTER — ANESTHESIA EVENT (OUTPATIENT)
Dept: LABOR AND DELIVERY | Facility: HOSPITAL | Age: 24
End: 2024-11-13
Payer: MEDICAID

## 2024-11-13 LAB — TREPONEMA PALLIDUM IGG+IGM AB [PRESENCE] IN SERUM OR PLASMA BY IMMUNOASSAY: NORMAL

## 2024-11-13 PROCEDURE — 3E033VJ INTRODUCTION OF OTHER HORMONE INTO PERIPHERAL VEIN, PERCUTANEOUS APPROACH: ICD-10-PCS | Performed by: STUDENT IN AN ORGANIZED HEALTH CARE EDUCATION/TRAINING PROGRAM

## 2024-11-13 PROCEDURE — 25810000003 LACTATED RINGERS PER 1000 ML: Performed by: STUDENT IN AN ORGANIZED HEALTH CARE EDUCATION/TRAINING PROGRAM

## 2024-11-13 PROCEDURE — 59409 OBSTETRICAL CARE: CPT | Performed by: STUDENT IN AN ORGANIZED HEALTH CARE EDUCATION/TRAINING PROGRAM

## 2024-11-13 PROCEDURE — 25010000002 METHYLERGONOVINE MALEATE PER 0.2 MG: Performed by: STUDENT IN AN ORGANIZED HEALTH CARE EDUCATION/TRAINING PROGRAM

## 2024-11-13 PROCEDURE — 25810000003 LACTATED RINGERS SOLUTION: Performed by: STUDENT IN AN ORGANIZED HEALTH CARE EDUCATION/TRAINING PROGRAM

## 2024-11-13 PROCEDURE — 51702 INSERT TEMP BLADDER CATH: CPT

## 2024-11-13 PROCEDURE — 25010000002 PENICILLIN G POTASSIUM PER 600000 UNITS: Performed by: MIDWIFE

## 2024-11-13 PROCEDURE — 51703 INSERT BLADDER CATH COMPLEX: CPT

## 2024-11-13 PROCEDURE — 0KQM0ZZ REPAIR PERINEUM MUSCLE, OPEN APPROACH: ICD-10-PCS | Performed by: STUDENT IN AN ORGANIZED HEALTH CARE EDUCATION/TRAINING PROGRAM

## 2024-11-13 PROCEDURE — C1755 CATHETER, INTRASPINAL: HCPCS | Performed by: NURSE ANESTHETIST, CERTIFIED REGISTERED

## 2024-11-13 RX ORDER — ACETAMINOPHEN 500 MG
1000 TABLET ORAL EVERY 6 HOURS
Status: DISCONTINUED | OUTPATIENT
Start: 2024-11-13 | End: 2024-11-13

## 2024-11-13 RX ORDER — DOCUSATE SODIUM 100 MG/1
100 CAPSULE, LIQUID FILLED ORAL 2 TIMES DAILY
Status: DISCONTINUED | OUTPATIENT
Start: 2024-11-13 | End: 2024-11-15 | Stop reason: HOSPADM

## 2024-11-13 RX ORDER — OXYCODONE HYDROCHLORIDE 5 MG/1
5 TABLET ORAL ONCE
Status: COMPLETED | OUTPATIENT
Start: 2024-11-13 | End: 2024-11-13

## 2024-11-13 RX ORDER — CARBOPROST TROMETHAMINE 250 UG/ML
250 INJECTION, SOLUTION INTRAMUSCULAR AS NEEDED
Status: DISCONTINUED | OUTPATIENT
Start: 2024-11-13 | End: 2024-11-13

## 2024-11-13 RX ORDER — IBUPROFEN 800 MG/1
800 TABLET, FILM COATED ORAL EVERY 6 HOURS SCHEDULED
Status: DISCONTINUED | OUTPATIENT
Start: 2024-11-13 | End: 2024-11-14

## 2024-11-13 RX ORDER — OXYTOCIN/0.9 % SODIUM CHLORIDE 30/500 ML
250 PLASTIC BAG, INJECTION (ML) INTRAVENOUS CONTINUOUS
Status: DISPENSED | OUTPATIENT
Start: 2024-11-13 | End: 2024-11-13

## 2024-11-13 RX ORDER — HYDROCORTISONE 25 MG/G
1 CREAM TOPICAL AS NEEDED
Status: DISCONTINUED | OUTPATIENT
Start: 2024-11-13 | End: 2024-11-15 | Stop reason: HOSPADM

## 2024-11-13 RX ORDER — ONDANSETRON 4 MG/1
4 TABLET, ORALLY DISINTEGRATING ORAL EVERY 6 HOURS PRN
Status: DISCONTINUED | OUTPATIENT
Start: 2024-11-13 | End: 2024-11-13

## 2024-11-13 RX ORDER — ONDANSETRON 2 MG/ML
4 INJECTION INTRAMUSCULAR; INTRAVENOUS ONCE AS NEEDED
Status: DISCONTINUED | OUTPATIENT
Start: 2024-11-13 | End: 2024-11-13

## 2024-11-13 RX ORDER — IBUPROFEN 800 MG/1
800 TABLET, FILM COATED ORAL EVERY 6 HOURS SCHEDULED
Status: DISCONTINUED | OUTPATIENT
Start: 2024-11-13 | End: 2024-11-13

## 2024-11-13 RX ORDER — ACETAMINOPHEN 325 MG/1
650 TABLET ORAL EVERY 4 HOURS PRN
Status: DISCONTINUED | OUTPATIENT
Start: 2024-11-13 | End: 2024-11-13

## 2024-11-13 RX ORDER — OXYTOCIN/0.9 % SODIUM CHLORIDE 30/500 ML
125 PLASTIC BAG, INJECTION (ML) INTRAVENOUS CONTINUOUS PRN
Status: DISCONTINUED | OUTPATIENT
Start: 2024-11-13 | End: 2024-11-15 | Stop reason: HOSPADM

## 2024-11-13 RX ORDER — BISACODYL 10 MG
10 SUPPOSITORY, RECTAL RECTAL DAILY PRN
Status: DISCONTINUED | OUTPATIENT
Start: 2024-11-14 | End: 2024-11-15 | Stop reason: HOSPADM

## 2024-11-13 RX ORDER — FLUTICASONE PROPIONATE 50 MCG
2 SPRAY, SUSPENSION (ML) NASAL DAILY
Status: DISCONTINUED | OUTPATIENT
Start: 2024-11-13 | End: 2024-11-15 | Stop reason: HOSPADM

## 2024-11-13 RX ORDER — MORPHINE SULFATE 2 MG/ML
2 INJECTION, SOLUTION INTRAMUSCULAR; INTRAVENOUS
Status: DISCONTINUED | OUTPATIENT
Start: 2024-11-13 | End: 2024-11-13

## 2024-11-13 RX ORDER — DIPHENHYDRAMINE HCL 25 MG
25 CAPSULE ORAL NIGHTLY PRN
Status: DISCONTINUED | OUTPATIENT
Start: 2024-11-13 | End: 2024-11-15 | Stop reason: HOSPADM

## 2024-11-13 RX ORDER — IBUPROFEN 600 MG/1
600 TABLET, FILM COATED ORAL EVERY 6 HOURS PRN
Status: DISCONTINUED | OUTPATIENT
Start: 2024-11-13 | End: 2024-11-13

## 2024-11-13 RX ORDER — PROMETHAZINE HYDROCHLORIDE 12.5 MG/1
12.5 SUPPOSITORY RECTAL EVERY 6 HOURS PRN
Status: DISCONTINUED | OUTPATIENT
Start: 2024-11-13 | End: 2024-11-13

## 2024-11-13 RX ORDER — ACETAMINOPHEN 500 MG
1000 TABLET ORAL EVERY 6 HOURS
Status: DISCONTINUED | OUTPATIENT
Start: 2024-11-14 | End: 2024-11-14

## 2024-11-13 RX ORDER — ONDANSETRON 2 MG/ML
4 INJECTION INTRAMUSCULAR; INTRAVENOUS EVERY 6 HOURS PRN
Status: DISCONTINUED | OUTPATIENT
Start: 2024-11-13 | End: 2024-11-15 | Stop reason: HOSPADM

## 2024-11-13 RX ORDER — ONDANSETRON 4 MG/1
4 TABLET, ORALLY DISINTEGRATING ORAL EVERY 6 HOURS PRN
Status: DISCONTINUED | OUTPATIENT
Start: 2024-11-13 | End: 2024-11-15 | Stop reason: HOSPADM

## 2024-11-13 RX ORDER — EPHEDRINE SULFATE 5 MG/ML
5 INJECTION INTRAVENOUS
Status: DISCONTINUED | OUTPATIENT
Start: 2024-11-13 | End: 2024-11-13

## 2024-11-13 RX ORDER — PROMETHAZINE HYDROCHLORIDE 12.5 MG/1
12.5 TABLET ORAL EVERY 4 HOURS PRN
Status: DISCONTINUED | OUTPATIENT
Start: 2024-11-13 | End: 2024-11-15 | Stop reason: HOSPADM

## 2024-11-13 RX ORDER — ALBUTEROL SULFATE 90 UG/1
2 INHALANT RESPIRATORY (INHALATION) EVERY 6 HOURS PRN
Status: DISCONTINUED | OUTPATIENT
Start: 2024-11-13 | End: 2024-11-15 | Stop reason: HOSPADM

## 2024-11-13 RX ORDER — PROMETHAZINE HYDROCHLORIDE 12.5 MG/1
12.5 TABLET ORAL EVERY 6 HOURS PRN
Status: DISCONTINUED | OUTPATIENT
Start: 2024-11-13 | End: 2024-11-13

## 2024-11-13 RX ORDER — METHYLERGONOVINE MALEATE 0.2 MG/ML
200 INJECTION INTRAVENOUS ONCE AS NEEDED
Status: COMPLETED | OUTPATIENT
Start: 2024-11-13 | End: 2024-11-13

## 2024-11-13 RX ORDER — PRENATAL VIT/IRON FUM/FOLIC AC 27MG-0.8MG
1 TABLET ORAL DAILY
Status: DISCONTINUED | OUTPATIENT
Start: 2024-11-13 | End: 2024-11-15 | Stop reason: HOSPADM

## 2024-11-13 RX ORDER — CITRIC ACID/SODIUM CITRATE 334-500MG
30 SOLUTION, ORAL ORAL ONCE
Status: DISCONTINUED | OUTPATIENT
Start: 2024-11-13 | End: 2024-11-13

## 2024-11-13 RX ORDER — CALCIUM CARBONATE 500 MG/1
2 TABLET, CHEWABLE ORAL 3 TIMES DAILY PRN
Status: DISCONTINUED | OUTPATIENT
Start: 2024-11-13 | End: 2024-11-15 | Stop reason: HOSPADM

## 2024-11-13 RX ORDER — POLYETHYLENE GLYCOL 3350 17 G/17G
17 POWDER, FOR SOLUTION ORAL DAILY PRN
Status: DISCONTINUED | OUTPATIENT
Start: 2024-11-13 | End: 2024-11-15 | Stop reason: HOSPADM

## 2024-11-13 RX ORDER — ONDANSETRON 2 MG/ML
4 INJECTION INTRAMUSCULAR; INTRAVENOUS EVERY 6 HOURS PRN
Status: DISCONTINUED | OUTPATIENT
Start: 2024-11-13 | End: 2024-11-13

## 2024-11-13 RX ORDER — OXYTOCIN/0.9 % SODIUM CHLORIDE 30/500 ML
999 PLASTIC BAG, INJECTION (ML) INTRAVENOUS ONCE
Status: COMPLETED | OUTPATIENT
Start: 2024-11-13 | End: 2024-11-13

## 2024-11-13 RX ORDER — IBUPROFEN 600 MG/1
600 TABLET, FILM COATED ORAL EVERY 6 HOURS SCHEDULED
Status: DISCONTINUED | OUTPATIENT
Start: 2024-11-13 | End: 2024-11-13

## 2024-11-13 RX ORDER — SODIUM CHLORIDE 0.9 % (FLUSH) 0.9 %
1-10 SYRINGE (ML) INJECTION AS NEEDED
Status: DISCONTINUED | OUTPATIENT
Start: 2024-11-13 | End: 2024-11-15 | Stop reason: HOSPADM

## 2024-11-13 RX ORDER — ACETAMINOPHEN 325 MG/1
650 TABLET ORAL EVERY 6 HOURS
Status: DISCONTINUED | OUTPATIENT
Start: 2024-11-13 | End: 2024-11-13

## 2024-11-13 RX ORDER — MISOPROSTOL 200 UG/1
800 TABLET ORAL AS NEEDED
Status: DISCONTINUED | OUTPATIENT
Start: 2024-11-13 | End: 2024-11-13

## 2024-11-13 RX ORDER — FERROUS SULFATE 324(65)MG
324 TABLET, DELAYED RELEASE (ENTERIC COATED) ORAL 2 TIMES DAILY WITH MEALS
Status: DISCONTINUED | OUTPATIENT
Start: 2024-11-13 | End: 2024-11-15 | Stop reason: HOSPADM

## 2024-11-13 RX ADMIN — PRENATAL VITAMINS-IRON FUMARATE 27 MG IRON-FOLIC ACID 0.8 MG TABLET 1 TABLET: at 20:05

## 2024-11-13 RX ADMIN — FERROUS SULFATE TAB EC 324 MG (65 MG FE EQUIVALENT) 324 MG: 324 (65 FE) TABLET DELAYED RESPONSE at 20:05

## 2024-11-13 RX ADMIN — PENICILLIN G 3 MILLION UNITS: 3000000 INJECTION, SOLUTION INTRAVENOUS at 12:20

## 2024-11-13 RX ADMIN — DOCUSATE SODIUM 100 MG: 100 CAPSULE, LIQUID FILLED ORAL at 20:05

## 2024-11-13 RX ADMIN — IBUPROFEN 600 MG: 600 TABLET ORAL at 18:02

## 2024-11-13 RX ADMIN — OXYCODONE HYDROCHLORIDE 5 MG: 5 TABLET ORAL at 19:20

## 2024-11-13 RX ADMIN — Medication 250 ML/HR: at 15:24

## 2024-11-13 RX ADMIN — Medication 2 MILLI-UNITS/MIN: at 02:10

## 2024-11-13 RX ADMIN — SODIUM CHLORIDE, POTASSIUM CHLORIDE, SODIUM LACTATE AND CALCIUM CHLORIDE 1000 ML: 600; 310; 30; 20 INJECTION, SOLUTION INTRAVENOUS at 06:48

## 2024-11-13 RX ADMIN — Medication 250 ML/HR: at 16:08

## 2024-11-13 RX ADMIN — ACETAMINOPHEN 1000 MG: 500 TABLET, FILM COATED ORAL at 19:20

## 2024-11-13 RX ADMIN — FLUTICASONE PROPIONATE 2 SPRAY: 50 SPRAY, METERED NASAL at 20:05

## 2024-11-13 RX ADMIN — PENICILLIN G 3 MILLION UNITS: 3000000 INJECTION, SOLUTION INTRAVENOUS at 06:05

## 2024-11-13 RX ADMIN — Medication 999 ML/HR: at 15:06

## 2024-11-13 RX ADMIN — BENZOCAINE AND LEVOMENTHOL: 200; 5 SPRAY TOPICAL at 19:21

## 2024-11-13 RX ADMIN — SODIUM CHLORIDE, POTASSIUM CHLORIDE, SODIUM LACTATE AND CALCIUM CHLORIDE 999 ML/HR: 600; 310; 30; 20 INJECTION, SOLUTION INTRAVENOUS at 13:06

## 2024-11-13 RX ADMIN — Medication 12 ML/HR: at 08:03

## 2024-11-13 RX ADMIN — METHYLERGONOVINE MALEATE 200 MCG: 0.2 INJECTION, SOLUTION INTRAMUSCULAR; INTRAVENOUS at 16:10

## 2024-11-13 RX ADMIN — PENICILLIN G 3 MILLION UNITS: 3000000 INJECTION, SOLUTION INTRAVENOUS at 02:10

## 2024-11-13 NOTE — NON STRESS TEST
"  Sharonda Saez, a  at 39w6d with an BRENNEN of 2024, by Ultrasound, was seen at [facility] for a nonstress test.    Chief Complaint   Patient presents with    Scheduled Induction     \"I am here for my induction.\"       Patient Active Problem List   Diagnosis    IUGR (intrauterine growth restriction) in prior pregnancy, pregnant    Pregnancy       Start Time:   Stop Time:     Interpretation A  Nonstress Test Interpretation A: Reactive                 "

## 2024-11-13 NOTE — L&D DELIVERY NOTE
[facility]  Vaginal Delivery Note    Delivery Details   Delivery: Vaginal, Vacuum (Extractor)    YOB: 2026   Time of birth: 3:05 PM   Anesthesia Epidural    Delivering clinician: Jojo Gold   Forceps: No   Vacuum: Yes  Vacuum Delivery  Vacuum indication: Category 2 FHR tracing remote from delivery   Vacuum type: Kiwi   Application location: +3 station/ outlet   First Attempt     Time applied: 1501   Time removed: 1505   Number of pulls: 3 CTX/ 9 pulls   Number of pop-offs: 0   Total application time: 4 min   Applied by: DR. GOLD   Failed? No      Shoulder dystocia: No     Delivery narrative:  The patient progressed in labor to 10/100/+2 and began pushing. The tracing became notable for decelerations following pushing efforts with increasing time of recovery and occasional rebound tachycardia. The patient was counseled regarding the option for a vacuum assisted vaginal delivery and the risks/ benefits were reviewed. She agreed to proceed. The vacuum was applied and the patient was instructed to push. Over the following three contractions, the head was successfully delivered and the vacuum suction was released. The patient was then instructed to push again and spontaneously delivered a viable male infant. The infant was placed on the mother's abdomen and the mouth and nose were bulb suctioned. Cord clamping was delayed 60 seconds. The placenta was delivered in standard fashion and was found to be intact. Uterine tone and bleeding were adequate with massage and Pitocin. Lacerations were inspected and repaired as detailed below. A red rubber catheter was used to drain the bladder after repair to confirm that the urethra remained patent.    Infant Details   Findings: Male infant   Infant observations: Weight: 3283 g (7 lb 3.8 oz)  Length: 20 in    Apgars: 8  @ 1 minute /    9  @ 5 minutes     Placenta, Cord, and Fluid   Placenta delivered:  Spontaneous at   11/13/2024  3:06 PM    Cord: 3 vessels  present.   Nuchal cord: Yes; Number of nuchal loops present: 1    Cord blood obtained: Yes     Repair    Episiotomy: None   Lacerations: Yes  Laceration Information  Laceration Repaired?   Perineal: 2nd Yes, repaired with 3-0 Vicryl in the usual running fashion.   Periurethral: Yes Yes, repaired with 3-0 Vicryl in an interrupted fashion.    Labial:       Sulcus:       Vaginal:       Cervical:         Suture used for repair: 3-0 Vicryl   Estimated blood loss: 500 cc     Complications  None    Disposition  Mother to Mother Baby/Postpartum in stable condition currently.  Baby remains with mom in stable condition currently.    Jojo Veloz MD  11/13/24  16:36 EST

## 2024-11-13 NOTE — ANESTHESIA PROCEDURE NOTES
Labor Epidural      Patient reassessed immediately prior to procedure    Patient location during procedure: OB  Start Time: 11/13/2024 7:45 AM  Performed By  CRNA/CAA: [employer] CRNA  Preanesthetic Checklist  Completed: patient identified, IV checked, site marked, risks and benefits discussed, surgical consent, monitors and equipment checked, pre-op evaluation and timeout performed  Additional Notes  Risks discussed , including but not limited to:  Headache, itching, n&v, infection, failure, decreased blood pressure, permanent chronic/back pain, nerve damage, paralysis, etc. All questions answered and informed consent obtained. Skin localized with lidocaine skin wheel. Test dose 2+3 ml of 1.5% lidocaine with 1:200,000 epi.  Prep:  Pt Position:sitting  Sterile Tech:cap, gloves, mask and sterile barrier  Prep:chlorhexidine gluconate and isopropyl alcohol  Monitoring:blood pressure monitoring and continuous pulse oximetry  Epidural Block Procedure:  Approach:midline  Guidance:landmark technique and palpation technique  Location:L3-L4  Needle Type:Tuohy  Needle Gauge:18 G  Loss of Resistance Medium: air  Loss of Resistance: 7cm  Cath Depth at skin:14 cm  Paresthesia: none  Aspiration:negative  Test Dose:negative  Number of Attempts: 1  Post Assessment:  Dressing:occlusive dressing applied and secured with tape  Pt Tolerance:patient tolerated the procedure well with no apparent complications  Complications:no

## 2024-11-13 NOTE — ANESTHESIA PREPROCEDURE EVALUATION
Anesthesia Evaluation     Patient summary reviewed and Nursing notes reviewed   NPO Solid Status: > 8 hours  NPO Liquid Status: > 8 hours           Airway   Mallampati: I  TM distance: >3 FB  Neck ROM: full  No difficulty expected  Dental - normal exam     Pulmonary - normal exam   (+) a smoker Former, asthma,  Cardiovascular - negative cardio ROS and normal exam  Exercise tolerance: good (4-7 METS)        Neuro/Psych  (+) psychiatric history Anxiety and Depression  GI/Hepatic/Renal/Endo - negative ROS     Musculoskeletal (-) negative ROS    Abdominal  - normal exam    Bowel sounds: normal.   Substance History - negative use     OB/GYN    (+) Pregnant        Other        ROS/Med Hx Other: Plt 248                Anesthesia Plan    ASA 2     epidural       Anesthetic plan, risks, benefits, and alternatives have been provided, discussed and informed consent has been obtained with: patient.  Pre-procedure education provided

## 2024-11-13 NOTE — PLAN OF CARE
Goal Outcome Evaluation:  Plan of Care Reviewed With: patient        Progress: improving     Pt arrived for elective IOL. Pt given 1 dose cytotec and pitocin started 4 hrs after. Pt VSS, pt reports GBS positive and penicillin Q4h. Pt plans on attempting to breastfeed and wants to potentially do both, breastfeed and formula feeding. Pt significant other at bedside.

## 2024-11-14 LAB
BASOPHILS # BLD AUTO: 0.02 10*3/MM3 (ref 0–0.2)
BASOPHILS NFR BLD AUTO: 0.2 % (ref 0–1.5)
DEPRECATED RDW RBC AUTO: 41.7 FL (ref 37–54)
EOSINOPHIL # BLD AUTO: 0.08 10*3/MM3 (ref 0–0.4)
EOSINOPHIL NFR BLD AUTO: 0.7 % (ref 0.3–6.2)
ERYTHROCYTE [DISTWIDTH] IN BLOOD BY AUTOMATED COUNT: 13.9 % (ref 12.3–15.4)
HCT VFR BLD AUTO: 27.4 % (ref 34–46.6)
HGB BLD-MCNC: 8.9 G/DL (ref 12–15.9)
IMM GRANULOCYTES # BLD AUTO: 0.07 10*3/MM3 (ref 0–0.05)
IMM GRANULOCYTES NFR BLD AUTO: 0.6 % (ref 0–0.5)
LYMPHOCYTES # BLD AUTO: 2.98 10*3/MM3 (ref 0.7–3.1)
LYMPHOCYTES NFR BLD AUTO: 24.6 % (ref 19.6–45.3)
MCH RBC QN AUTO: 26.9 PG (ref 26.6–33)
MCHC RBC AUTO-ENTMCNC: 32.5 G/DL (ref 31.5–35.7)
MCV RBC AUTO: 82.8 FL (ref 79–97)
MONOCYTES # BLD AUTO: 1.03 10*3/MM3 (ref 0.1–0.9)
MONOCYTES NFR BLD AUTO: 8.5 % (ref 5–12)
NEUTROPHILS NFR BLD AUTO: 65.4 % (ref 42.7–76)
NEUTROPHILS NFR BLD AUTO: 7.94 10*3/MM3 (ref 1.7–7)
NRBC BLD AUTO-RTO: 0 /100 WBC (ref 0–0.2)
PLATELET # BLD AUTO: 221 10*3/MM3 (ref 140–450)
PMV BLD AUTO: 9.7 FL (ref 6–12)
RBC # BLD AUTO: 3.31 10*6/MM3 (ref 3.77–5.28)
WBC NRBC COR # BLD AUTO: 12.12 10*3/MM3 (ref 3.4–10.8)

## 2024-11-14 PROCEDURE — 25010000002 MEASLES, MUMPS & RUBELLA VAC RECONSTITUTED SOLUTION: Performed by: STUDENT IN AN ORGANIZED HEALTH CARE EDUCATION/TRAINING PROGRAM

## 2024-11-14 PROCEDURE — 99222 1ST HOSP IP/OBS MODERATE 55: CPT | Performed by: OBSTETRICS & GYNECOLOGY

## 2024-11-14 PROCEDURE — 90707 MMR VACCINE SC: CPT | Performed by: STUDENT IN AN ORGANIZED HEALTH CARE EDUCATION/TRAINING PROGRAM

## 2024-11-14 PROCEDURE — 85025 COMPLETE CBC W/AUTO DIFF WBC: CPT | Performed by: STUDENT IN AN ORGANIZED HEALTH CARE EDUCATION/TRAINING PROGRAM

## 2024-11-14 PROCEDURE — 90471 IMMUNIZATION ADMIN: CPT | Performed by: STUDENT IN AN ORGANIZED HEALTH CARE EDUCATION/TRAINING PROGRAM

## 2024-11-14 RX ORDER — ACETAMINOPHEN 500 MG
1000 TABLET ORAL EVERY 6 HOURS
Status: DISCONTINUED | OUTPATIENT
Start: 2024-11-14 | End: 2024-11-15 | Stop reason: HOSPADM

## 2024-11-14 RX ORDER — IBUPROFEN 800 MG/1
800 TABLET, FILM COATED ORAL EVERY 6 HOURS SCHEDULED
Status: DISCONTINUED | OUTPATIENT
Start: 2024-11-14 | End: 2024-11-15 | Stop reason: HOSPADM

## 2024-11-14 RX ORDER — FAMOTIDINE 20 MG/1
20 TABLET, FILM COATED ORAL 2 TIMES DAILY PRN
Status: DISCONTINUED | OUTPATIENT
Start: 2024-11-14 | End: 2024-11-15 | Stop reason: HOSPADM

## 2024-11-14 RX ADMIN — PRENATAL VITAMINS-IRON FUMARATE 27 MG IRON-FOLIC ACID 0.8 MG TABLET 1 TABLET: at 09:15

## 2024-11-14 RX ADMIN — IBUPROFEN 800 MG: 800 TABLET ORAL at 18:30

## 2024-11-14 RX ADMIN — ACETAMINOPHEN 1000 MG: 500 TABLET, FILM COATED ORAL at 20:03

## 2024-11-14 RX ADMIN — MEASLES, MUMPS, AND RUBELLA VIRUS VACCINE LIVE 0.5 ML: 1000; 12500; 1000 INJECTION, POWDER, LYOPHILIZED, FOR SUSPENSION SUBCUTANEOUS at 20:39

## 2024-11-14 RX ADMIN — ACETAMINOPHEN 1000 MG: 500 TABLET, FILM COATED ORAL at 09:15

## 2024-11-14 RX ADMIN — MAGNESIUM HYDROXIDE 10 ML: 2400 SUSPENSION ORAL at 06:09

## 2024-11-14 RX ADMIN — IBUPROFEN 800 MG: 800 TABLET ORAL at 00:11

## 2024-11-14 RX ADMIN — FERROUS SULFATE TAB EC 324 MG (65 MG FE EQUIVALENT) 324 MG: 324 (65 FE) TABLET DELAYED RESPONSE at 07:41

## 2024-11-14 RX ADMIN — ACETAMINOPHEN 1000 MG: 500 TABLET, FILM COATED ORAL at 03:03

## 2024-11-14 RX ADMIN — DOCUSATE SODIUM 100 MG: 100 CAPSULE, LIQUID FILLED ORAL at 20:03

## 2024-11-14 RX ADMIN — IBUPROFEN 800 MG: 800 TABLET ORAL at 23:47

## 2024-11-14 RX ADMIN — IBUPROFEN 800 MG: 800 TABLET ORAL at 06:06

## 2024-11-14 RX ADMIN — FERROUS SULFATE TAB EC 324 MG (65 MG FE EQUIVALENT) 324 MG: 324 (65 FE) TABLET DELAYED RESPONSE at 18:30

## 2024-11-14 RX ADMIN — DOCUSATE SODIUM 100 MG: 100 CAPSULE, LIQUID FILLED ORAL at 09:15

## 2024-11-14 RX ADMIN — ACETAMINOPHEN 1000 MG: 500 TABLET, FILM COATED ORAL at 14:28

## 2024-11-14 NOTE — PROGRESS NOTES
"Patient: Sharonda Saez  * No surgery found *  Anesthesia type: Anesthesia type cannot be found on the log.    Patient location: Labor and Delivery  Last vitals: /68 (BP Location: Left arm, Patient Position: Lying)   Pulse 73   Temp 97.6 °F (36.4 °C) (Oral)   Resp 18   Ht 167.6 cm (66\")   Wt 91.6 kg (202 lb)   LMP 10/28/2023   SpO2 99%   Breastfeeding Yes   BMI 32.60 kg/m²   Level of consciousness: awake, alert, and oriented    Post-anesthesia pain: adequate analgesia  Airway patency: patent  Respiratory: unassisted  Cardiovascular: stable and blood pressure at baseline  Hydration: euvolemic    Anesthetic complications: no  "

## 2024-11-14 NOTE — LACTATION NOTE
Lactation Consult Note    Evaluation Completed: 2024 16:56 EST  Patient Name: Sharonda Saez  :  2000  MRN:  [id number]     REFERRAL  INFORMATION:                          Date of Referral: 24   Person Making Referral: lactation consultant  Maternal Reason for Referral: breastfeeding currently, previous breastfeeding issues  Infant Reason for Referral:  ()    DELIVERY HISTORY:,   Infant First Feeding: breastfeeding     Skin to skin initiation date/time: 2024 3:06 PM  Skin to skin end date/time: 2024 5:10 PM       MATERNAL ASSESSMENT:  Breast Size Issue: none (24 1505)  Breast Shape: Bilateral:, round (24 150)  Breast Density: Bilateral:, filling, full, soft (24 150)  Areola: Bilateral:, elastic (24 150)  Nipples: Bilateral:, bulbous, everted, graspable (24 150)     Left Nipple Symptoms: intact, tender (24 150)  Right Nipple Symptoms: intact, tender (24 1505)  Breast Signs/Symptoms of Infection:  (none) (24 150)      Sharonda Saez  24-year-old second time Mom, second time breast feeder.   She breast-fed for one months with her last baby.  She stated her baby got where she would not take the breast and she had to go to bottle.        INFANT ASSESSMENT:  Information for the patient's :  Joe Saez [[id number]]   No past medical history on file.      Wasta boy 1-day-old born yesterday at 40 weeks and 0 days gestation.  Has been breast-feeding good per nurse report.       MATERNAL INFANT FEEDING:     Maternal Emotional State: anxious, receptive (24 150)  Infant Positioning: cradle (24 150)   Signs of Milk Transfer: deep jaw excursions noted, suck/swallow ratio (after relatch) (24 150)  Pain with Feeding: yes (24 150)  Pain Location: nipple, left (24 150)  Pain Description: soreness (pinchy) (24 1505)  Comfort Measures Before/During Feeding: suction broken  using finger, latch adjusted (11/14/24 1505)     Comfort Measures Following Feeding: air-drying encouraged, expressed milk applied (11/14/24 1505)  Nipple Shape After Feeding, Left Breast: pinched, other (see comments) (after relatch it appropiately projected.) (11/14/24 1505)  Nipple Shape After Feeding, Right: pinched (11/14/24 1505)  Latch Assistance: full assistance needed, verbal guidance offered (11/14/24 1505)     Placed baby skin to skin he quickly rooted over towards nipple and mom latch to mom.  She stated it was painful I had her break suction with her finger and showed her how to get him higher up on the areola and she stated it did not hurt after the read latch.  He had good jaw excursions and a wide mouth gape with the relatch.  Breast feeding education done on obtaining a higher latch.  All questions answered at this time.      EQUIPMENT TYPE:  Breast Pump Type: double electric, personal (11/14/24 1505)      BREAST PUMPING:  Breast Pumping Interventions: post-feed pumping encouraged (if giving a bottle.) (11/14/24 1505)       LACTATION REFERRALS:  Lactation Referrals: outpatient lactation program (as needed) (11/14/24 1505)

## 2024-11-14 NOTE — H&P
"Subjective   Chief Complaint   Patient presents with    Scheduled Induction     \"I am here for my induction.\"     Sharonda Saez is a 24 y.o. year old .  Patient's last menstrual period was 10/28/2023.  She presents to be seen because of induction of labor at 40 weeks.  Prior .  Pregnancy course here to for benign otherwise.     OTHER COMPLAINTS:  Nothing else    The following portions of the patient's history were reviewed and updated as appropriate:She  has a past medical history of Anxiety, Asthma, Asthma, and Depression.  She does not have any pertinent problems on file.  She  has a past surgical history that includes Gallbladder surgery ().  Her family history includes Colon cancer in her maternal grandfather; Diabetes in her maternal grandmother; Heart disease in her maternal grandfather; Lung cancer in her maternal grandfather; Stroke in her maternal grandfather and paternal grandfather.  She  reports that she quit smoking about 4 years ago. Her smoking use included cigarettes. She started smoking about 6 years ago. She has a 0.5 pack-year smoking history. She has never used smokeless tobacco. She reports that she does not currently use drugs. She reports that she does not drink alcohol.  Current Facility-Administered Medications   Medication Dose Route Frequency Provider Last Rate Last Admin    acetaminophen (TYLENOL) tablet 1,000 mg  1,000 mg Oral Q6H Bowen Marrero MD   1,000 mg at 24 0303    albuterol sulfate HFA (PROVENTIL HFA;VENTOLIN HFA;PROAIR HFA) inhaler 2 puff  2 puff Inhalation Q6H PRN Jojo Veloz MD        benzocaine (AMERICAINE) 20 % rectal ointment 1 Application  1 Application Rectal PRN Jojo Veloz MD        benzocaine-menthol (DERMOPLAST) 20-0.5 % topical spray   Topical PRN Jojo Veloz MD   Given at 24 192    bisacodyl (DULCOLAX) suppository 10 mg  10 mg Rectal Daily PRN Jojo Veloz MD        calcium carbonate (TUMS) chewable " tablet 500 mg (200 mg elemental)  2 tablet Oral TID PRN Jojo Veloz MD        diphenhydrAMINE (BENADRYL) capsule 25 mg  25 mg Oral Nightly PRN Jojo Veloz MD        docusate sodium (COLACE) capsule 100 mg  100 mg Oral BID Jojo Veloz MD   100 mg at 11/13/24 2005    ferrous sulfate EC tablet 324 mg  324 mg Oral BID With Meals Jojo Veloz MD   324 mg at 11/14/24 0741    fluticasone (FLONASE) 50 MCG/ACT nasal spray 2 spray  2 spray Each Nare Daily Jojo Veloz MD   2 spray at 11/13/24 2005    Hydrocortisone (Perianal) (ANUSOL-HC) 2.5 % rectal cream 1 Application  1 Application Rectal PRN Jojo Veloz MD        ibuprofen (ADVIL,MOTRIN) tablet 800 mg  800 mg Oral Q6H Bowen Marrero MD   800 mg at 11/14/24 0606    lanolin topical 1 Application  1 Application Topical Q1H PRN Jojo Veloz MD        magnesium hydroxide (MILK OF MAGNESIA) suspension 10 mL  10 mL Oral Daily PRN Jojo Veloz MD   10 mL at 11/14/24 0609    Measles, Mumps & Rubella Vac (MMR) injection 0.5 mL  0.5 mL Subcutaneous During Hospitalization Jojo Veloz MD        ondansetron ODT (ZOFRAN-ODT) disintegrating tablet 4 mg  4 mg Oral Q6H PRN Jojo Veloz MD        Or    ondansetron (ZOFRAN) injection 4 mg  4 mg Intravenous Q6H PRN Jojo Veloz MD        oxytocin (PITOCIN) 30 units in 0.9% sodium chloride 500 mL (premix)  125 mL/hr Intravenous Continuous PRN Jojo Veloz MD        polyethylene glycol (MIRALAX) packet 17 g  17 g Oral Daily PRN Jojo Veloz MD        pramoxine-hydrocortisone 1-1 % foam 1 Application  1 Application Topical PRN Jojo Veloz MD        prenatal vitamin tablet 1 tablet  1 tablet Oral Daily Jojo Veloz MD   1 tablet at 11/13/24 2005    promethazine (PHENERGAN) tablet 12.5 mg  12.5 mg Oral Q4H PRN Jojo Veloz MD        sodium chloride 0.9 % flush 1-10 mL  1-10 mL Intravenous PRN Jojo Veloz MD         No current  "facility-administered medications on file prior to encounter.     Current Outpatient Medications on File Prior to Encounter   Medication Sig    famotidine (PEPCID) 20 MG tablet Take 1 tablet by mouth 2 (Two) Times a Day.    fluticasone (FLONASE) 50 MCG/ACT nasal spray 2 sprays Daily.    Prenatal MV-Min-FA-Omega-3 (Prenatal Gummies/DHA & FA) 0.4-32.5 MG chewable tablet Chew 1 tablet/day Daily.    albuterol sulfate HFA (Ventolin HFA) 108 (90 Base) MCG/ACT inhaler Inhale 2 puffs Every 6 (Six) Hours As Needed for Wheezing or Shortness of Air.    EPINEPHrine (EPIPEN) 0.3 MG/0.3ML solution auto-injector injection Inject 0.3 mL into the appropriate muscle as directed by prescriber 1 (One) Time As Needed (anaphylaxis) for up to 1 dose.    ferrous sulfate 324 (65 Fe) MG tablet delayed-release EC tablet Take 1 tablet by mouth 2 (Two) Times a Day With Meals.     She is allergic to bee venom.    Social History    Tobacco Use      Smoking status: Former        Packs/day: 0.00        Years: 0.3 packs/day for 2.0 years (0.5 ttl pk-yrs)        Types: Cigarettes        Start date: 2018        Quit date: 2020        Years since quittin.1      Smokeless tobacco: Never    Review of Systems  Consitutional POS: nothing reported    NEG: anorexia or night sweats   Gastointestinal POS: nothing reported    NEG: bloating, change in bowel habits, melena, or reflux symptoms   Genitourinary POS: nothing reported    NEG: dysuria or hematuria   Integument POS: nothing reported    NEG: moles that are changing in size, shape, color or rashes   Breast POS: nothing reported    NEG: persistent breast lump, skin dimpling, or nipple discharge         Respiratory: negative  Cardiovascular: negative  GYN:  negative          Objective   /59 (BP Location: Left arm, Patient Position: Lying)   Pulse 73   Temp 97.8 °F (36.6 °C) (Oral)   Resp 18   Ht 167.6 cm (66\")   Wt 91.6 kg (202 lb)   LMP 10/28/2023   SpO2 99%   Breastfeeding Yes  "  BMI 32.60 kg/m²     General:  well developed; well nourished  no acute distress  mentation appropriate   Skin:  No suspicious lesions seen   Thyroid: normal to inspection and palpation   Lungs:  breathing is unlabored  clear to auscultation bilaterally   Heart:  regular rate and rhythm, S1, S2 normal, no murmur, click, rub or gallop   Breasts:  Not performed.   Abdomen: soft, non-tender; no masses  no umbilical or inguinal hernias are present  no hepato-splenomegaly   Pelvis: Clinical staff was present for exam     Psychiatric: Alert and oriented ×3, mood and affect appropriate  HEENT: Atraumatic, normocephalic, normal scleral icterus  Extremities: 2+ pulses bilaterally, no edema      Lab Review   CBC    Imaging   Pelvic ultrasound report        Assessment   Intrauterine pregnancy 40 weeks 0 days  GBS positive.  Patient received antibiotics overnight     Plan   Admit.  Pitocin augmentation.  Artificial rupture membranes performed with clear fluid noted.  Epidural per patient request.  Fetal status category 1.      New Medications Ordered This Visit   Medications    lactated ringers bolus 1,000 mL    miSOPROStol (CYTOTEC) split tablet 25 mcg    penicillin G in iso-osmotic dextrose IVPB 3 million units (premix)    Famotidine (PF) (PEPCID) injection 20 mg    FOLLOWED BY Linked Order Group     oxytocin (PITOCIN) 30 units in 0.9% sodium chloride 500 mL (premix)     oxytocin (PITOCIN) 30 units in 0.9% sodium chloride 500 mL (premix)    methylergonovine (METHERGINE) injection 200 mcg    albuterol sulfate HFA (PROVENTIL HFA;VENTOLIN HFA;PROAIR HFA) inhaler 2 puff    ferrous sulfate EC tablet 324 mg    fluticasone (FLONASE) 50 MCG/ACT nasal spray 2 spray    sodium chloride 0.9 % flush 1-10 mL    oxytocin (PITOCIN) 30 units in 0.9% sodium chloride 500 mL (premix)    diphenhydrAMINE (BENADRYL) capsule 25 mg    bisacodyl (DULCOLAX) suppository 10 mg    magnesium hydroxide (MILK OF MAGNESIA) suspension 10 mL    docusate sodium  (COLACE) capsule 100 mg    polyethylene glycol (MIRALAX) packet 17 g    lanolin topical 1 Application    benzocaine-menthol (DERMOPLAST) 20-0.5 % topical spray    pramoxine-hydrocortisone 1-1 % foam 1 Application    Hydrocortisone (Perianal) (ANUSOL-HC) 2.5 % rectal cream 1 Application    benzocaine (AMERICAINE) 20 % rectal ointment 1 Application    OR Linked Order Group     ondansetron ODT (ZOFRAN-ODT) disintegrating tablet 4 mg     ondansetron (ZOFRAN) injection 4 mg    promethazine (PHENERGAN) tablet 12.5 mg    prenatal vitamin tablet 1 tablet    calcium carbonate (TUMS) chewable tablet 500 mg (200 mg elemental)    Measles, Mumps & Rubella Vac (MMR) injection 0.5 mL    oxyCODONE (ROXICODONE) immediate release tablet 5 mg    acetaminophen (TYLENOL) tablet 1,000 mg    ibuprofen (ADVIL,MOTRIN) tablet 800 mg          This note was electronically signed.      November 14, 2024

## 2024-11-14 NOTE — PROGRESS NOTES
Ramon Saez  : 2000  MRN: [id number]  CSN: [id number]    Postpartum Day #1  Subjective   Her pain is well controlled.  Vaginal bleeding is appropriate amount. She is voiding without difficulty. She is breast feeding .     Objective     Min/max vitals past 24 hours:   Temp  Min: 97.8 °F (36.6 °C)  Max: 98.3 °F (36.8 °C)  BP  Min: 91/56  Max: 125/103  Pulse  Min: 48  Max: 243  Resp  Min: 16  Max: 18        General: well developed; well nourished  no acute distress   Abdomen: fundus firm and non-tender   Pelvic: Not performed   Ext: Calves NT     Lab Results   Component Value Date    WBC 7.83 2024    HGB 9.9 (L) 2024    HCT 30.7 (L) 2024    MCV 83.9 2024     2024    ABO A 2024    RH Positive 2024    RUBELLAABIGG <0.90 (L) 2024    HEPBSAG Negative 2024        Assessment   Postpartum Day #1 S/P vacuum delivery  Anemia     Plan   Continue routine postpartum care    This note was electronically signed  Stacey St, COURTNEY  2024  09:02 EST

## 2024-11-14 NOTE — PAYOR COMM NOTE
"TO:[facility]  FROM:ODELL AGUIRRE, RN PHONE [phone]  CLINICALS REF# [id number]  Sharonda Matias (24 y.o. Female)       Date of Birth   2000    Social Security Number       Address   [address] [place] [id number]    Home Phone   [phone]    MRN   [id number]       Mandaeism   Patient Refused    Marital Status   Single                            Admission Date   11/12/24    Admission Type   Elective    Admitting Provider   Stacey St CNM    Attending Provider   Stacey St CNM    Department, Room/Bed   [facility] OB GYN, W206/1       Discharge Date       Discharge Disposition       Discharge Destination                                 Attending Provider: Stacey St CNM    Allergies: Bee Venom    Isolation: None   Infection: None   Code Status: CPR    Ht: 167.6 cm (66\")   Wt: 91.6 kg (202 lb)    Admission Cmt: None   Principal Problem: None                  Active Insurance as of 11/12/2024       Primary Coverage       Payor Plan Insurance Group Employer/Plan Group    [employer]HE[employer] MEDICAID ANTH MEDICAID [id number]       Payor Plan Address Payor Plan Phone Number Payor Plan Fax Number Effective Dates    [address] [address] [phone]  5/1/2024 - None Entered    [facility] [id number]         Subscriber Name Subscriber Birth Date Member ID       SHARONDA MATIAS 2000 [id number]                     Emergency Contacts        (Rel.) Home Phone Work Phone Mobile Phone    DORINDA MATIAS (Mother) [phone] -- --              History & Physical    No notes of this type exist for this encounter.       Vital Signs (last day)       Date/Time Temp Temp src Pulse Resp BP Patient Position SpO2    11/14/24 0300 98 (36.7) Oral 73 16 116/72 Lying 99    11/13/24 2244 97.9 (36.6) Oral 73 16 117/77 Lying 99    11/13/24 1900 98.3 (36.8) Oral 73 16 117/75 Lying 99    11/13/24 1846 97.9 (36.6) Oral 77 18 119/76 Lying 96    11/13/24 " 1801 -- -- 69 -- 115/67 -- --    11/13/24 1734 -- -- 72 -- 111/61 -- --    11/13/24 1730 -- -- 72 -- 111/61 -- --    11/13/24 1715 -- -- 56 -- 106/57 -- --    11/13/24 1701 -- -- 69 -- 96/51 -- --    11/13/24 1646 -- -- 62 -- 96/81 -- --    11/13/24 1631 -- -- 85 -- 103/67 -- --    11/13/24 1616 -- -- 82 -- 98/48 -- --    11/13/24 1601 97.9 (36.6) Oral 59 18 99/51 Lying 98    11/13/24 1546 -- -- 70 -- 104/87 -- --    11/13/24 1531 -- -- 243 -- 111/94 -- --    11/13/24 1516 98.2 (36.8) Temporal 101 18 116/43 Lying 100    11/13/24 1501 -- -- 125 -- 125/103 -- --    11/13/24 1500 -- -- 57 -- -- -- 100    11/13/24 1446 -- -- -- -- 120/103 -- --    11/13/24 1445 -- -- 63 -- -- -- 100    11/13/24 1431 -- -- 68 -- 108/91 -- --    11/13/24 1430 -- -- 67 -- -- -- 100    11/13/24 1425 -- -- 71 -- -- -- 100    11/13/24 1420 -- -- 65 -- -- -- 100    11/13/24 1416 -- -- 79 -- 93/50 -- --    11/13/24 1415 -- -- 65 -- -- -- 100    11/13/24 1405 -- -- 57 -- -- -- 100    11/13/24 1401 97.9 (36.6) Oral 58 16 104/72 Lying --    11/13/24 1400 -- -- 64 -- -- -- 100    11/13/24 1346 -- -- 62 -- 110/65 -- --    11/13/24 1345 -- -- 55 -- -- -- 100    11/13/24 1340 -- -- 61 -- -- -- 100    11/13/24 1331 -- -- 62 -- 99/63 -- --    11/13/24 1325 -- -- 52 -- -- -- 100    11/13/24 1316 -- -- 51 -- 92/48 -- --    11/13/24 1315 -- -- 51 -- -- -- 100    11/13/24 1305 98 (36.7) Oral 52 16 -- Lying --    11/13/24 1300 -- -- 48 -- 107/64 -- --    11/13/24 1252 -- -- 58 -- -- -- --    11/13/24 1246 -- -- 60 -- 96/61 -- --    11/13/24 1245 -- -- 56 -- -- -- 100    11/13/24 1240 -- -- 58 -- -- -- 99    11/13/24 1235 -- -- 54 -- -- -- 99    11/13/24 1231 -- -- 56 -- 96/50 -- --    11/13/24 1230 -- -- 50 -- -- -- 99    11/13/24 1225 -- -- 51 -- -- -- 100    11/13/24 1220 -- -- 50 -- -- -- 100    11/13/24 1216 -- -- 58 -- 94/59 -- --    11/13/24 1210 -- -- 53 -- -- -- 100    11/13/24 1205 -- -- 55 -- -- -- 100    11/13/24 1201 98.3 (36.8) Oral 61 18 95/60  Lying --    11/13/24 1200 -- -- 60 -- -- -- 99    11/13/24 1155 -- -- 59 -- -- -- 100    11/13/24 1150 -- -- 52 -- -- -- 100    11/13/24 1146 -- -- 53 -- 99/56 -- --    11/13/24 1135 -- -- 52 -- -- -- 100    11/13/24 1131 -- -- 59 -- 109/71 -- --    11/13/24 1130 -- -- 59 -- -- -- 99    11/13/24 1116 -- -- 52 -- 106/58 -- --    11/13/24 1115 -- -- 52 -- -- -- --    11/13/24 1110 -- -- 51 -- -- -- --    11/13/24 1101 98.2 (36.8) Oral 70 18 105/44 Lying --    11/13/24 1050 -- -- 63 -- -- -- 100    11/13/24 1046 -- -- 68 -- 108/62 -- --    11/13/24 1045 -- -- 76 -- -- -- 100    11/13/24 1031 -- -- 58 -- 97/64 -- --    11/13/24 1030 -- -- 61 -- -- -- 100    11/13/24 1025 -- -- 57 -- -- -- 100    11/13/24 1016 98.2 (36.8) Oral 64 18 98/54 Lying --    11/13/24 1000 -- -- 52 -- 91/56 -- --    11/13/24 0955 -- -- 53 -- -- -- 100    11/13/24 0950 -- -- 74 -- -- -- 100    11/13/24 0946 -- -- 66 -- 98/54 -- --    11/13/24 0931 -- -- 77 -- 98/59 -- --    11/13/24 0930 -- -- 54 -- -- -- 100    11/13/24 0925 -- -- 53 -- -- -- 100    11/13/24 0915 -- -- 68 -- 109/59 -- 100    11/13/24 0910 -- -- 61 -- -- -- 100    11/13/24 0905 97.8 (36.6) Oral 59 16 -- -- 100    11/13/24 0901 -- -- 64 -- 105/46 -- --    11/13/24 0900 -- -- 64 -- -- -- 100    11/13/24 0846 -- -- 56 -- 102/56 -- --    11/13/24 0845 -- -- 63 -- -- -- 100    11/13/24 0840 -- -- 61 -- -- -- 100    11/13/24 0835 -- -- 68 -- -- -- 100    11/13/24 0834 -- -- 61 -- 86/37 -- --    11/13/24 0831 -- -- 63 -- 104/63 -- --    11/13/24 0830 -- -- 58 -- -- -- 100    11/13/24 0828 -- -- 57 -- 107/58 -- --    11/13/24 0825 -- -- 61 -- 107/60 -- 100    11/13/24 0822 -- -- 64 -- 112/57 -- --    11/13/24 0820 -- -- 80 -- -- -- 100    11/13/24 0819 -- -- 63 -- 112/62 -- --    11/13/24 0816 -- -- 70 -- 105/49 -- --    11/13/24 0815 -- -- 72 -- -- -- 100    11/13/24 0813 -- -- 71 -- 105/49 -- --    11/13/24 0810 -- -- 65 -- 103/56 -- 100    11/13/24 0807 -- -- 76 -- 99/66 -- --     11/13/24 0805 -- -- 69 -- -- -- 100    11/13/24 0804 -- -- 67 -- 113/58 -- --    11/13/24 0801 -- -- 63 -- 109/59 -- --    11/13/24 0800 -- -- 64 -- -- -- 100    11/13/24 0758 -- -- 92 -- 100/59 -- --    11/13/24 0755 -- -- 75 -- -- -- 100    11/13/24 0750 -- -- 64 -- -- -- 100    11/13/24 0749 -- -- 59 -- 111/60 -- --    11/13/24 0746 -- -- 59 -- 126/62 -- --    11/13/24 0745 -- -- 64 -- -- -- 100    11/13/24 0743 -- -- 63 -- 116/65 -- --    11/13/24 0741 -- -- 57 -- 110/60 -- --    11/13/24 0740 -- -- 68 -- -- -- 100    11/13/24 0735 -- -- 55 -- -- -- 100    11/13/24 0731 -- -- 54 -- 112/63 -- --    11/13/24 0719 97.9 (36.6) Oral 63 16 97/51 Lying 100    11/13/24 0700 -- -- 52 -- 107/63 Lying 100    11/13/24 0630 -- -- 81 -- 89/46 Lying --    11/13/24 0600 -- -- 65 -- 96/51 Lying --    11/13/24 0530 -- -- 68 -- 111/62 Lying --    11/13/24 0500 -- -- 67 -- 106/70 Lying --    11/13/24 0430 -- -- 58 -- 105/48 Lying --    11/13/24 0400 98.1 (36.7) Oral 55 -- 106/55 Lying --    11/13/24 0330 -- -- 56 -- 110/63 Lying --    11/13/24 0300 -- -- 61 -- 102/66 -- --    11/13/24 0230 -- -- 56 -- 113/62 -- --    11/13/24 0200 -- -- 61 -- 111/66 -- --    11/13/24 0130 -- -- 52 -- 110/63 -- --    11/13/24 0100 -- -- 64 -- 109/60 -- --    11/13/24 0030 98.2 (36.8) Oral 70 -- 112/73 -- --    11/13/24 0000 -- -- 61 -- 103/65 -- --          Current Facility-Administered Medications   Medication Dose Route Frequency Provider Last Rate Last Admin    acetaminophen (TYLENOL) tablet 1,000 mg  1,000 mg Oral Q6H Bowen Marrero MD   1,000 mg at 11/14/24 0303    albuterol sulfate HFA (PROVENTIL HFA;VENTOLIN HFA;PROAIR HFA) inhaler 2 puff  2 puff Inhalation Q6H PRN Jojo Veloz MD        benzocaine (AMERICAINE) 20 % rectal ointment 1 Application  1 Application Rectal PRN Jojo Veloz MD        benzocaine-menthol (DERMOPLAST) 20-0.5 % topical spray   Topical PRN Jojo Veloz MD   Given at 11/13/24 1921    bisacodyl  (DULCOLAX) suppository 10 mg  10 mg Rectal Daily PRN Jojo Veloz MD        calcium carbonate (TUMS) chewable tablet 500 mg (200 mg elemental)  2 tablet Oral TID PRN Jojo Veloz MD        diphenhydrAMINE (BENADRYL) capsule 25 mg  25 mg Oral Nightly PRN Jojo Veloz MD        docusate sodium (COLACE) capsule 100 mg  100 mg Oral BID Jojo Veloz MD   100 mg at 11/13/24 2005    ferrous sulfate EC tablet 324 mg  324 mg Oral BID With Meals Jojo Veloz MD   324 mg at 11/13/24 2005    fluticasone (FLONASE) 50 MCG/ACT nasal spray 2 spray  2 spray Each Nare Daily Jojo Veloz MD   2 spray at 11/13/24 2005    Hydrocortisone (Perianal) (ANUSOL-HC) 2.5 % rectal cream 1 Application  1 Application Rectal PRN Jojo Veloz MD        ibuprofen (ADVIL,MOTRIN) tablet 800 mg  800 mg Oral Q6H Bowen Marrero MD   800 mg at 11/14/24 0606    lanolin topical 1 Application  1 Application Topical Q1H PRN Jojo Veloz MD        magnesium hydroxide (MILK OF MAGNESIA) suspension 10 mL  10 mL Oral Daily PRN Jojo Veloz MD   10 mL at 11/14/24 0609    Measles, Mumps & Rubella Vac (MMR) injection 0.5 mL  0.5 mL Subcutaneous During Hospitalization Jojo Veloz MD        ondansetron ODT (ZOFRAN-ODT) disintegrating tablet 4 mg  4 mg Oral Q6H PRN Jojo Veloz MD        Or    ondansetron (ZOFRAN) injection 4 mg  4 mg Intravenous Q6H PRN Jojo Veloz MD        oxytocin (PITOCIN) 30 units in 0.9% sodium chloride 500 mL (premix)  125 mL/hr Intravenous Continuous PRN Jojo Veloz MD        polyethylene glycol (MIRALAX) packet 17 g  17 g Oral Daily PRN Jojo Veloz MD        pramoxine-hydrocortisone 1-1 % foam 1 Application  1 Application Topical PRN Jojo Veloz MD        prenatal vitamin tablet 1 tablet  1 tablet Oral Daily Jojo Veloz MD   1 tablet at 11/13/24 2005    promethazine (PHENERGAN) tablet 12.5 mg  12.5 mg Oral Q4H PRN Jojo Veloz MD        sodium  chloride 0.9 % flush 1-10 mL  1-10 mL Intravenous PRN Jojo Veloz MD         Lab Results (last 24 hours)       ** No results found for the last 24 hours. **          Physician Progress Notes (last 48 hours)  Notes from 11/12/24 0738 through 11/14/24 0738   No notes of this type exist for this encounter.

## 2024-11-15 VITALS
BODY MASS INDEX: 32.47 KG/M2 | HEIGHT: 66 IN | OXYGEN SATURATION: 99 % | WEIGHT: 202 LBS | SYSTOLIC BLOOD PRESSURE: 115 MMHG | HEART RATE: 73 BPM | RESPIRATION RATE: 16 BRPM | DIASTOLIC BLOOD PRESSURE: 66 MMHG | TEMPERATURE: 97.5 F

## 2024-11-15 RX ORDER — IBUPROFEN 800 MG/1
800 TABLET, FILM COATED ORAL EVERY 8 HOURS PRN
Qty: 60 TABLET | Refills: 0 | Status: SHIPPED | OUTPATIENT
Start: 2024-11-15

## 2024-11-15 RX ORDER — PSEUDOEPHEDRINE HCL 30 MG
100 TABLET ORAL 2 TIMES DAILY PRN
Qty: 30 CAPSULE | Refills: 0 | Status: SHIPPED | OUTPATIENT
Start: 2024-11-15

## 2024-11-15 RX ADMIN — IBUPROFEN 800 MG: 800 TABLET ORAL at 05:26

## 2024-11-15 RX ADMIN — BENZOCAINE AND LEVOMENTHOL 1 APPLICATION: 200; 5 SPRAY TOPICAL at 11:35

## 2024-11-15 RX ADMIN — DOCUSATE SODIUM 100 MG: 100 CAPSULE, LIQUID FILLED ORAL at 08:52

## 2024-11-15 RX ADMIN — FERROUS SULFATE TAB EC 324 MG (65 MG FE EQUIVALENT) 324 MG: 324 (65 FE) TABLET DELAYED RESPONSE at 08:52

## 2024-11-15 RX ADMIN — ACETAMINOPHEN 1000 MG: 500 TABLET, FILM COATED ORAL at 08:52

## 2024-11-15 RX ADMIN — CALCIUM CARBONATE (ANTACID) CHEW TAB 500 MG 2 TABLET: 500 CHEW TAB at 10:30

## 2024-11-15 RX ADMIN — ACETAMINOPHEN 1000 MG: 500 TABLET, FILM COATED ORAL at 03:23

## 2024-11-15 RX ADMIN — PRENATAL VITAMINS-IRON FUMARATE 27 MG IRON-FOLIC ACID 0.8 MG TABLET 1 TABLET: at 08:52

## 2024-11-15 NOTE — PLAN OF CARE
Goal Outcome Evaluation:              Outcome Evaluation: VSS, adequate I/O, breastfeeding well, ambulating without difficutly, lochia WDL, pain managed with ordered medication, plan to discharge today

## 2024-11-15 NOTE — DISCHARGE SUMMARY
Discharge Summary     Ramon Saez  : 2000  MRN: [id number]  [facility]: [id number]    Date of Admission: 2024   Date of Discharge:  11/15/2024   Delivering Physician: Jojo Veloz       Admission Diagnosis: Pregnancy [Z34.90]   Discharge Diagnosis: Same as above plus  Pregnancy at 40w0d - delivered       Procedures: 2024 - Vaginal, Vacuum (Extractor)      Hospital Course  Patient is a 24 y.o.  who at 40w0d had a vaginal birth.  Her postpartum course was without complications.  On PPD #2 she was ready for discharge.  She had normal lochia and pain well controlled with oral medications. She is breast feeding . She is undecided regarding birth control.    Exam  General: no acute distress  Resp: unlabored  Abdomen: FF  Extremities: no edema    Infant  male fetus weighing 3283 g (7 lb 3.8 oz)  Apgars -  8 @ 1 minute /  9 @ 5 minutes.    Discharge labs  Lab Results   Component Value Date    WBC 12.12 (H) 2024    HGB 8.9 (L) 2024    HCT 27.4 (L) 2024     2024       Discharge Medications     Discharge Medications        New Medications        Instructions Start Date   docusate sodium 100 MG capsule   100 mg, Oral, 2 Times Daily PRN      ibuprofen 800 MG tablet  Commonly known as: ADVIL,MOTRIN   800 mg, Oral, Every 8 Hours PRN             Continue These Medications        Instructions Start Date   albuterol sulfate  (90 Base) MCG/ACT inhaler  Commonly known as: Ventolin HFA   2 puffs, Inhalation, Every 6 Hours PRN      EPINEPHrine 0.3 MG/0.3ML solution auto-injector injection  Commonly known as: EPIPEN   0.3 mg, Intramuscular, Once As Needed      famotidine 20 MG tablet  Commonly known as: PEPCID   20 mg, Oral, 2 Times Daily      ferrous sulfate 324 (65 Fe) MG tablet delayed-release EC tablet   324 mg, Oral, 2 Times Daily With Meals      fluticasone 50 MCG/ACT nasal spray  Commonly known as: FLONASE   2 sprays, Daily      Prenatal Gummies/DHA &  FA 0.4-32.5 MG chewable tablet   1 tablet/day, Oral, Daily               Discharge Disposition Home or Self Care   Condition on Discharge: good   Follow-up: 6 weeks with LISANDRO Navarro     Time spent on discharge: 30 minutes or less  Stacey St, APRN  11/15/2024

## 2024-11-15 NOTE — PLAN OF CARE
Goal Outcome Evaluation:           Progress: improving  Outcome Evaluation: VSS. Brestfeeding continues with ongoing education provided. Positive bonding observed with  Anticipating discharge home tomorrow

## 2024-11-15 NOTE — PAYOR COMM NOTE
"To:  Vergas  From: Amaya Aviles RN  Phone: [phone]  Fax: [phone]  NPI: [id number]  TIN: [id number]  Member ID: [id number]   MRN: [id number]    Sharonda Saez (24 y.o. Female)       Date of Birth   2000    Social Security Number       Address   [address]  [address] [place] [id number]    Home Phone   [phone]    MRN   [id number]       Rastafarian   Patient Refused    Marital Status   Single                            Admission Date   11/12/24    Admission Type   Elective    Admitting Provider   Stacey St CNM    Attending Provider       Department, Room/Bed   [facility] OB GYN, W206/1       Discharge Date   11/15/2024    Discharge Disposition   Home or Self Care    Discharge Destination                                 Attending Provider: (none)   Allergies: Bee Venom    Isolation: None   Infection: None   Code Status: Prior    Ht: 167.6 cm (66\")   Wt: 91.6 kg (202 lb)    Admission Cmt: None   Principal Problem: None                  Active Insurance as of 11/12/2024       Primary Coverage       Payor Plan Insurance Group Employer/Plan Group    [employer]HE[employer] MEDICAID ANTH MEDICAID [id number]       Payor Plan Address Payor Plan Phone Number Payor Plan Fax Number Effective Dates    [address] [address] [phone]  5/1/2024 - None Entered    [facility] [id number]         Subscriber Name Subscriber Birth Date Member ID       SHARONDA SAEZ 2000 [id number]                     Emergency Contacts        (Rel.) Home Phone Work Phone Mobile Phone    DORINDA SAEZ (Mother) [phone] -- --                 Discharge Summary        Stacey St CNM at 11/15/24 1006       Attestation signed by Jojo Veloz MD at 11/15/24 1104    I have reviewed this documentation and agree.    Jojo Veloz MD   Obstetrics and Gynecology  [facility]                 Discharge Summary     Ramon Maloneyley " Nadja  : 2000  MRN: [id number]  [facility]: [id number]    Date of Admission: 2024   Date of Discharge:  11/15/2024   Delivering Physician: Jojo Veloz       Admission Diagnosis: Pregnancy [Z34.90]   Discharge Diagnosis: Same as above plus  Pregnancy at 40w0d - delivered       Procedures: 2024 - Vaginal, Vacuum (Extractor)      Hospital Course  Patient is a 24 y.o.  who at 40w0d had a vaginal birth.  Her postpartum course was without complications.  On PPD #2 she was ready for discharge.  She had normal lochia and pain well controlled with oral medications. She is breast feeding . She is undecided regarding birth control.    Exam  General: no acute distress  Resp: unlabored  Abdomen: FF  Extremities: no edema    Infant  male fetus weighing 3283 g (7 lb 3.8 oz)  Apgars -  8 @ 1 minute /  9 @ 5 minutes.    Discharge labs  Lab Results   Component Value Date    WBC 12.12 (H) 2024    HGB 8.9 (L) 2024    HCT 27.4 (L) 2024     2024       Discharge Medications     Discharge Medications        New Medications        Instructions Start Date   docusate sodium 100 MG capsule   100 mg, Oral, 2 Times Daily PRN      ibuprofen 800 MG tablet  Commonly known as: ADVIL,MOTRIN   800 mg, Oral, Every 8 Hours PRN             Continue These Medications        Instructions Start Date   albuterol sulfate  (90 Base) MCG/ACT inhaler  Commonly known as: Ventolin HFA   2 puffs, Inhalation, Every 6 Hours PRN      EPINEPHrine 0.3 MG/0.3ML solution auto-injector injection  Commonly known as: EPIPEN   0.3 mg, Intramuscular, Once As Needed      famotidine 20 MG tablet  Commonly known as: PEPCID   20 mg, Oral, 2 Times Daily      ferrous sulfate 324 (65 Fe) MG tablet delayed-release EC tablet   324 mg, Oral, 2 Times Daily With Meals      fluticasone 50 MCG/ACT nasal spray  Commonly known as: FLONASE   2 sprays, Daily      Prenatal Gummies/DHA & FA 0.4-32.5 MG chewable tablet   1  tablet/day, Oral, Daily               Discharge Disposition Home or Self Care   Condition on Discharge: good   Follow-up: 6 weeks with [facility]     Time spent on discharge: 30 minutes or less  Stacey St, COURTNEY  11/15/2024      Electronically signed by Jojo Veloz MD at 11/15/24 3870

## 2024-11-15 NOTE — CASE MANAGEMENT/SOCIAL WORK
Case Management/Social Work    Patient Name:  Sharonda Saez  YOB: 2000  MRN: [id number]  Admit Date:  11/12/2024      Consult received for mom positive for THC during pregnancy.       Chart review performed by this Sw.  Baby did not have a UDS completed during admission. Cord has been sent for further evaluation. Mother of baby had a normal UDS completed at time of delivery.  THC positive pregnancy test was from six months ago.  At this time no further social work  interventions are warranted.  Consult will be removed and baby can dc to home with mom when medically ready.       Electronically signed by:  YIN Gates  11/15/24 08:36 EST

## 2024-11-25 ENCOUNTER — MATERNAL SCREENING (OUTPATIENT)
Dept: CALL CENTER | Facility: HOSPITAL | Age: 24
End: 2024-11-25
Payer: MEDICAID

## 2024-11-25 NOTE — OUTREACH NOTE
Maternal Screening Survey      Flowsheet Row Responses   Facility patient discharged from? [facility]   Attempt successful? Yes   Call start time 154   Call end time 154   I have been able to laugh and see the funny side of things. 0   I have looked forward with enjoyment to things. 0   I have blamed myself unnecessarily when things went wrong. 0   I have been anxious or worried for no good reason. 2   I have felt scared or panicky for no good reason. 0   Things have been getting on top of me. 0   I have been so unhappy that I have had difficulty sleeping. 0   I have felt sad or miserable. 0   I have been so unhappy that I have been crying. 0   The thought of harming myself has occurred to me. 0   [place]  Depression Scale Total 2   Did any of your parents have problems with alcohol or drug use? No   Do any of your peers have problems with alcohol or drug use? No   Does your partner have problems with alcohol or drug use? No   Before you were pregnant did you have problems with alcohol or drug use? (past) No   In the past month, did you drink beer, wine, liquor or use any other drugs? (pregnancy) No   Maternal Screening call completed Yes   Call end time 154              Ira HOWARD - Registered Nurse

## 2024-11-25 NOTE — OUTREACH NOTE
Maternal Screening Survey      Flowsheet Row Responses   Facility patient discharged from? [facility]   Attempt successful? No   Unsuccessful attempts Attempt 1              Ira HOWARD - Registered Nurse

## 2025-02-11 ENCOUNTER — OFFICE VISIT (OUTPATIENT)
Dept: OBSTETRICS AND GYNECOLOGY | Facility: CLINIC | Age: 25
End: 2025-02-11
Payer: MEDICAID

## 2025-02-11 VITALS
BODY MASS INDEX: 33.23 KG/M2 | WEIGHT: 206.8 LBS | SYSTOLIC BLOOD PRESSURE: 120 MMHG | HEIGHT: 66 IN | DIASTOLIC BLOOD PRESSURE: 80 MMHG

## 2025-02-11 DIAGNOSIS — Z30.017 ENCOUNTER FOR INITIAL PRESCRIPTION OF NEXPLANON: Primary | ICD-10-CM

## 2025-02-11 NOTE — PROGRESS NOTES
GYN Office Visit    Subjective   Chief Complaint   Patient presents with    Contraception     Patient would like to place a Nexplanon order.      Sharonda Saez is a 24 y.o.  presenting to discuss birth control. She would like to order a Nexplanon. She delivered 24 via . She reports she has been doing well postpartum. She has not had a Nexplanon but has had Depo and did not like being on it. She has resumed menses since delivery.     OB Hx:   OB History    Para Term  AB Living   2 2 2     2   SAB IAB Ectopic Molar Multiple Live Births           0 2      # Outcome Date GA Lbr Mahad/2nd Weight Sex Type Anes PTL Lv   2 Term 24 40w0d 00:24 / 00:53 3283 g (7 lb 3.8 oz) M Vag-Vacuum EPI N NOE   1 Term 21 37w1d / 01:09 2495 g (5 lb 8 oz) M Vag-Spont EPI N NOE      Pap smear: 24, NILM  Mammogram: N/A  Colonoscopy: N/A  DEXA Scan: N/A    Past Medical History:   Diagnosis Date    Anxiety     Asthma     Asthma     Depression      Past Surgical History:   Procedure Laterality Date    GALLBLADDER SURGERY      LAPAROSCOPIC CHOLECYSTECTOMY      I think it was somewhere around july, after i had my son.     Family History   Problem Relation Age of Onset    Diabetes Maternal Grandmother     Colon cancer Maternal Grandfather     Lung cancer Maternal Grandfather     Heart disease Maternal Grandfather     Stroke Maternal Grandfather     Stroke Paternal Grandfather       Social History     Tobacco Use    Smoking status: Former     Current packs/day: 0.00     Average packs/day: 0.3 packs/day for 2.0 years (0.5 ttl pk-yrs)     Types: Cigarettes     Start date: 2018     Quit date: 2020     Years since quittin.4    Smokeless tobacco: Never   Vaping Use    Vaping status: Former    Substances: Nicotine    Devices: Disposable   Substance Use Topics    Alcohol use: Never    Drug use: Never     Comment: THC     Allergies   Allergen Reactions    Bee Venom Anaphylaxis     Current  "Outpatient Medications on File Prior to Visit   Medication Sig Dispense Refill    albuterol sulfate HFA (Ventolin HFA) 108 (90 Base) MCG/ACT inhaler Inhale 2 puffs Every 6 (Six) Hours As Needed for Wheezing or Shortness of Air. 8 g 5    docusate sodium 100 MG capsule Take 1 capsule by mouth 2 (Two) Times a Day As Needed for Constipation. 30 capsule 0    EPINEPHrine (EPIPEN) 0.3 MG/0.3ML solution auto-injector injection Inject 0.3 mL into the appropriate muscle as directed by prescriber 1 (One) Time As Needed (anaphylaxis) for up to 1 dose. 1 each 3    famotidine (PEPCID) 20 MG tablet Take 1 tablet by mouth 2 (Two) Times a Day. 60 tablet 5    ferrous sulfate 324 (65 Fe) MG tablet delayed-release EC tablet Take 1 tablet by mouth 2 (Two) Times a Day With Meals. 60 tablet 6    fluticasone (FLONASE) 50 MCG/ACT nasal spray 2 sprays Daily.      ibuprofen (ADVIL,MOTRIN) 800 MG tablet Take 1 tablet by mouth Every 8 (Eight) Hours As Needed for Mild Pain. 60 tablet 0    Prenatal MV-Min-FA-Omega-3 (Prenatal Gummies/DHA & FA) 0.4-32.5 MG chewable tablet Chew 1 tablet/day Daily. 30 tablet 4     No current facility-administered medications on file prior to visit.     Social History    Tobacco Use      Smoking status: Former        Packs/day: 0.00        Years: 0.3 packs/day for 2.0 years (0.5 ttl pk-yrs)        Types: Cigarettes        Start date: 2018        Quit date: 2020        Years since quittin.4      Smokeless tobacco: Never       Objective   /80   Ht 167.6 cm (66\")   Wt 93.8 kg (206 lb 12.8 oz)   LMP 2025   Breastfeeding No   BMI 33.38 kg/m²     Physical Exam:  General Appearance: alert, interactive, and NAD         Medical Decision Making:    Assessment & Plan      Diagnosis Plan   1. Encounter for initial prescription of Nexplanon           Medication Management: Nexplanon to be ordered    Procedures Performed: None    Reviewed expected bleeding profile with Nexplanon. Plan to insert with " next menses. All questions answered.    RTC for Nexplanon insertion.    Jojo Veloz MD  Obstetrics and Gynecology  [facility]

## 2025-03-12 ENCOUNTER — OFFICE VISIT (OUTPATIENT)
Dept: OBSTETRICS AND GYNECOLOGY | Facility: CLINIC | Age: 25
End: 2025-03-12
Payer: MEDICAID

## 2025-03-12 VITALS
BODY MASS INDEX: 33.27 KG/M2 | DIASTOLIC BLOOD PRESSURE: 60 MMHG | HEIGHT: 66 IN | WEIGHT: 207 LBS | SYSTOLIC BLOOD PRESSURE: 112 MMHG

## 2025-03-12 DIAGNOSIS — Z30.017 NEXPLANON INSERTION: Primary | ICD-10-CM

## 2025-03-12 LAB
B-HCG UR QL: NEGATIVE
EXPIRATION DATE: NORMAL
INTERNAL NEGATIVE CONTROL: NEGATIVE
INTERNAL POSITIVE CONTROL: POSITIVE
Lab: NORMAL

## 2025-03-12 RX ORDER — ETONOGESTREL 68 MG/1
IMPLANT SUBCUTANEOUS
COMMUNITY
Start: 2025-02-11

## 2025-03-12 NOTE — PROGRESS NOTES
Nexplanon Insertion    Patient's last menstrual period was 03/10/2025 (exact date).     Date of procedure:  3/12/2025    Risks and benefits discussed? Yes  All questions answered? Yes  Consents given by The patient  Written consent obtained? Yes    Local anesthesia used:  Yes - 2 cc's of 1% lidocaine with epinephrine    Procedure documentation:    The upper left arm (non-dominant) was marked at the intended site of insertion. Betadine was used to cleanse the skin. Local anesthesia was injected. The Nexplanon was placed subdermally without difficulty. The device was able to be palpated in the arm. Steri-strips were then placed across the site of insertion and the arm was wrapped.    She tolerated the procedure well. There were no complications. EBL was minimal.    Post procedure instructions: Remove the wrapping in 24 hours and the steri-strips in 3-4 days. The patient has been advised to contact the office if she develops fever, redness, swelling, pain, or discharge occurs at the procedure site. She has been counseled on the effectiveness of her contraception as well.    Follow up needed: DENY Veloz MD   Obstetrics and Gynecology  [facility]

## 2025-06-01 PROCEDURE — 99283 EMERGENCY DEPT VISIT LOW MDM: CPT | Performed by: STUDENT IN AN ORGANIZED HEALTH CARE EDUCATION/TRAINING PROGRAM

## 2025-06-02 ENCOUNTER — HOSPITAL ENCOUNTER (EMERGENCY)
Facility: HOSPITAL | Age: 25
Discharge: HOME | End: 2025-06-02
Attending: STUDENT IN AN ORGANIZED HEALTH CARE EDUCATION/TRAINING PROGRAM | Admitting: STUDENT IN AN ORGANIZED HEALTH CARE EDUCATION/TRAINING PROGRAM
Payer: MEDICAID

## 2025-06-02 VITALS
TEMPERATURE: 98.1 F | BODY MASS INDEX: 32.62 KG/M2 | HEART RATE: 71 BPM | OXYGEN SATURATION: 100 % | DIASTOLIC BLOOD PRESSURE: 64 MMHG | RESPIRATION RATE: 18 BRPM | SYSTOLIC BLOOD PRESSURE: 125 MMHG | WEIGHT: 203 LBS | HEIGHT: 66 IN

## 2025-06-02 DIAGNOSIS — T78.40XA ALLERGIC REACTION, INITIAL ENCOUNTER: Primary | ICD-10-CM

## 2025-06-02 PROCEDURE — 63710000001 DIPHENHYDRAMINE PER 50 MG

## 2025-06-02 PROCEDURE — 63710000001 PREDNISONE PER 5 MG

## 2025-06-02 RX ORDER — FAMOTIDINE 20 MG/1
20 TABLET, FILM COATED ORAL ONCE
Status: COMPLETED | OUTPATIENT
Start: 2025-06-02 | End: 2025-06-02

## 2025-06-02 RX ORDER — PREDNISONE 20 MG/1
20 TABLET ORAL 2 TIMES DAILY
Qty: 10 TABLET | Refills: 0 | Status: SHIPPED | OUTPATIENT
Start: 2025-06-02 | End: 2025-06-07

## 2025-06-02 RX ORDER — DIPHENHYDRAMINE HCL 25 MG
50 CAPSULE ORAL ONCE
Status: COMPLETED | OUTPATIENT
Start: 2025-06-02 | End: 2025-06-02

## 2025-06-02 RX ADMIN — PREDNISONE 60 MG: 50 TABLET ORAL at 02:06

## 2025-06-02 RX ADMIN — DIPHENHYDRAMINE HYDROCHLORIDE 50 MG: 25 CAPSULE ORAL at 02:06

## 2025-06-02 RX ADMIN — FAMOTIDINE 20 MG: 20 TABLET, FILM COATED ORAL at 02:06

## 2025-06-02 NOTE — ED NOTES
Pt left ER in stable condition and a&o x4. Pt left ambulatory and was discharged to [facility] at this time awaiting a cab. Pt had no further questions following discharge teaching,

## 2025-06-02 NOTE — ED PROVIDER NOTES
Subjective  History of Present Illness:    Patient is a 24-year-old male presented for evaluation of allergic reaction, rash, patient reports that she used Mireles on her legs the other day and accidentally got some on her face, since then, she has had burning and itching of her face, she has been using oral Benadryl, and topical hydrocortisone with minimal improvement, reports that she has some hives to her bilateral cheeks.  This has been ongoing since Wednesday, she denies any difficulty breathing, no airway swelling.  No tongue swelling.  No nausea vomiting diarrhea, no hypotension.  No known previous allergies except for bee venom.      Nurses Notes reviewed and agree, including vitals, allergies, social history and prior medical history.     REVIEW OF SYSTEMS: All systems reviewed and not pertinent unless noted.  Review of Systems   Constitutional:  Negative for fever.   HENT:  Negative for trouble swallowing.    Respiratory:  Negative for shortness of breath.    Cardiovascular:  Negative for chest pain.   Gastrointestinal:  Negative for abdominal pain, diarrhea, nausea and vomiting.   Skin:  Positive for rash.   All other systems reviewed and are negative.      Past Medical History:   Diagnosis Date    Anxiety     Asthma     Asthma     Depression        Allergies:    Bee venom      Past Surgical History:   Procedure Laterality Date    GALLBLADDER SURGERY      LAPAROSCOPIC CHOLECYSTECTOMY      I think it was somewhere around july, after i had my son.         Social History     Socioeconomic History    Marital status: Single   Tobacco Use    Smoking status: Former     Current packs/day: 0.00     Average packs/day: 0.3 packs/day for 2.0 years (0.5 ttl pk-yrs)     Types: Cigarettes     Start date: 2018     Quit date: 2020     Years since quittin.7    Smokeless tobacco: Never   Vaping Use    Vaping status: Former    Substances: Nicotine    Devices: Disposable   Substance and Sexual Activity     "Alcohol use: Never    Drug use: Never     Comment: THC    Sexual activity: Yes     Partners: Male     Birth control/protection: None         Family History   Problem Relation Age of Onset    Diabetes Maternal Grandmother     Colon cancer Maternal Grandfather     Lung cancer Maternal Grandfather     Heart disease Maternal Grandfather     Stroke Maternal Grandfather     Stroke Paternal Grandfather        Objective  Physical Exam:  /64 (BP Location: Left arm, Patient Position: Sitting)   Pulse 76   Temp 98.3 °F (36.8 °C) (Oral)   Resp 18   Ht 167.6 cm (66\")   Wt 92.1 kg (203 lb)   SpO2 100%   BMI 32.77 kg/m²      Physical Exam  Vitals and nursing note reviewed.   Constitutional:       General: She is not in acute distress.     Appearance: Normal appearance. She is not ill-appearing, toxic-appearing or diaphoretic.   HENT:      Head: Normocephalic and atraumatic.      Nose: Nose normal.      Mouth/Throat:      Mouth: Mucous membranes are moist.      Pharynx: Oropharynx is clear.   Eyes:      Extraocular Movements: Extraocular movements intact.      Pupils: Pupils are equal, round, and reactive to light.   Cardiovascular:      Rate and Rhythm: Normal rate and regular rhythm.      Pulses: Normal pulses.      Heart sounds: Normal heart sounds.   Pulmonary:      Effort: Pulmonary effort is normal. No respiratory distress.      Breath sounds: Normal breath sounds. No stridor. No wheezing, rhonchi or rales.   Abdominal:      General: There is no distension.      Palpations: Abdomen is soft.      Tenderness: There is no abdominal tenderness. There is no guarding or rebound.   Musculoskeletal:         General: Normal range of motion.      Cervical back: Normal range of motion and neck supple.   Skin:     General: Skin is warm and dry.      Findings: Rash present.      Comments: Small raised urticarial rash to the bilateral cheeks.  There is no glossitis of the tongue, oropharynx is clear, there is no posterior " oropharyngeal erythema.  There is no lesions or rashes on the trunk, arms, or extremities.  No evidence of anaphylaxis or progression of airway swelling.   Neurological:      Mental Status: She is alert.               Procedures    ED Course:         Lab Results (last 24 hours)       ** No results found for the last 24 hours. **             No radiology results from the last 24 hrs       MDM      Initial impression of presenting illness: Patient is a 24-year-old female presented for evaluation of allergic reaction, rash after using Mireles on the legs and accidentally getting some on the face.    DDX: includes but is not limited to: Contact dermatitis, cellulitis, chemical burn, allergic reaction, anaphylaxis, idiopathic urticaria, folliculitis, others    Patient arrives hemodynamically stable afebrile nontachycardic nontachypneic and nonhypoxic on room air with vitals interpreted by myself.     Pertinent features from physical exam: Small raised urticarial rash to the bilateral cheeks.  There is no glossitis of the tongue, oropharynx is clear, there is no posterior oropharyngeal erythema.  There is no lesions or rashes on the trunk, arms, or extremities.  No evidence of anaphylaxis or progression of airway swelling.  No stridor, lungs clear, no acute distress.    Initial diagnostic plan: No labs or imaging indicated at this time    Results from initial plan were reviewed and interpreted by me revealing N/A    Diagnostic information from other sources: Record reviewed    Interventions / Re-evaluation:   Medications   predniSONE (DELTASONE) tablet 60 mg (has no administration in time range)   diphenhydrAMINE (BENADRYL) capsule 50 mg (has no administration in time range)   famotidine (PEPCID) tablet 20 mg (has no administration in time range)       Results/clinical rationale were discussed with patient at bedside.  Patient would like to be discharged at this time.  Suspect with mild urticaria to the face this is likely a  contact reaction/mild allergic reaction.    Consultations/Discussion of results with other physicians: N/A    Disposition plan: Discharge, follow-up with primary care physician, will prescribe prednisone twice daily for 5 days, recommended moisturization to the face, avoidance of topical steroids given that she would be prescribed oral steroids.  Benadryl as needed for itching.  She was agreeable plan at bedside.  No signs of angioedema.  No signs of anaphylaxis.  -----    Final diagnoses:   Allergic reaction, initial encounter          Lucas Maguire PA-C  06/02/25 0155

## 2025-06-02 NOTE — DISCHARGE INSTRUCTIONS
Recommend moisturization to the face, I placed you on 5 days of steroids, you can use Benadryl as needed for itching.  Recommend avoiding topical steroids to the face as you are going to be on oral steroids.